# Patient Record
Sex: FEMALE | Race: WHITE | NOT HISPANIC OR LATINO | Employment: UNEMPLOYED | URBAN - METROPOLITAN AREA
[De-identification: names, ages, dates, MRNs, and addresses within clinical notes are randomized per-mention and may not be internally consistent; named-entity substitution may affect disease eponyms.]

---

## 2023-02-24 LAB — CALCIUM (MG/DL) IN SER/PLAS: 9.6 MG/DL (ref 8.6–10.6)

## 2023-06-02 DIAGNOSIS — Z00.00 HEALTHCARE MAINTENANCE: ICD-10-CM

## 2023-06-02 DIAGNOSIS — E11.9 TYPE 2 DIABETES MELLITUS WITHOUT COMPLICATION, WITHOUT LONG-TERM CURRENT USE OF INSULIN (MULTI): ICD-10-CM

## 2023-06-02 RX ORDER — ORAL SEMAGLUTIDE 7 MG/1
1 TABLET ORAL DAILY
Qty: 90 TABLET | Refills: 0 | Status: SHIPPED | OUTPATIENT
Start: 2023-06-02 | End: 2023-07-06 | Stop reason: SDUPTHER

## 2023-06-02 RX ORDER — CONJUGATED ESTROGENS 0.62 MG/G
CREAM VAGINAL
COMMUNITY
Start: 2017-09-20 | End: 2023-06-02 | Stop reason: SDUPTHER

## 2023-06-02 RX ORDER — ORAL SEMAGLUTIDE 7 MG/1
1 TABLET ORAL DAILY
COMMUNITY
Start: 2021-12-17 | End: 2023-06-02 | Stop reason: SDUPTHER

## 2023-06-29 LAB
ALANINE AMINOTRANSFERASE (SGPT) (U/L) IN SER/PLAS: 15 U/L (ref 7–45)
ALBUMIN (G/DL) IN SER/PLAS: 4.5 G/DL (ref 3.4–5)
ALBUMIN (MG/L) IN URINE: <7 MG/L
ALBUMIN/CREATININE (UG/MG) IN URINE: NORMAL UG/MG CRT (ref 0–30)
ALKALINE PHOSPHATASE (U/L) IN SER/PLAS: 37 U/L (ref 33–136)
ANION GAP IN SER/PLAS: 14 MMOL/L (ref 10–20)
ASPARTATE AMINOTRANSFERASE (SGOT) (U/L) IN SER/PLAS: 18 U/L (ref 9–39)
BILIRUBIN TOTAL (MG/DL) IN SER/PLAS: 0.5 MG/DL (ref 0–1.2)
CALCIDIOL (25 OH VITAMIN D3) (NG/ML) IN SER/PLAS: 58 NG/ML
CALCIUM (MG/DL) IN SER/PLAS: 9.1 MG/DL (ref 8.6–10.6)
CARBON DIOXIDE, TOTAL (MMOL/L) IN SER/PLAS: 29 MMOL/L (ref 21–32)
CHLORIDE (MMOL/L) IN SER/PLAS: 102 MMOL/L (ref 98–107)
CHOLESTEROL (MG/DL) IN SER/PLAS: 158 MG/DL (ref 0–199)
CHOLESTEROL IN HDL (MG/DL) IN SER/PLAS: 51.9 MG/DL
CHOLESTEROL/HDL RATIO: 3
COBALAMIN (VITAMIN B12) (PG/ML) IN SER/PLAS: 810 PG/ML (ref 211–911)
CREATININE (MG/DL) IN SER/PLAS: 0.64 MG/DL (ref 0.5–1.05)
CREATININE (MG/DL) IN URINE: 63.8 MG/DL (ref 20–320)
ERYTHROCYTE DISTRIBUTION WIDTH (RATIO) BY AUTOMATED COUNT: 12.8 % (ref 11.5–14.5)
ERYTHROCYTE MEAN CORPUSCULAR HEMOGLOBIN CONCENTRATION (G/DL) BY AUTOMATED: 32.1 G/DL (ref 32–36)
ERYTHROCYTE MEAN CORPUSCULAR VOLUME (FL) BY AUTOMATED COUNT: 104 FL (ref 80–100)
ERYTHROCYTES (10*6/UL) IN BLOOD BY AUTOMATED COUNT: 4.18 X10E12/L (ref 4–5.2)
ESTIMATED AVERAGE GLUCOSE FOR HBA1C: 169 MG/DL
GFR FEMALE: >90 ML/MIN/1.73M2
GLUCOSE (MG/DL) IN SER/PLAS: 162 MG/DL (ref 74–99)
HEMATOCRIT (%) IN BLOOD BY AUTOMATED COUNT: 43.6 % (ref 36–46)
HEMOGLOBIN (G/DL) IN BLOOD: 14 G/DL (ref 12–16)
HEMOGLOBIN A1C/HEMOGLOBIN TOTAL IN BLOOD: 7.5 %
LDL: 81 MG/DL (ref 0–99)
LEUKOCYTES (10*3/UL) IN BLOOD BY AUTOMATED COUNT: 6.8 X10E9/L (ref 4.4–11.3)
NRBC (PER 100 WBCS) BY AUTOMATED COUNT: 0 /100 WBC (ref 0–0)
PLATELETS (10*3/UL) IN BLOOD AUTOMATED COUNT: 245 X10E9/L (ref 150–450)
POTASSIUM (MMOL/L) IN SER/PLAS: 3.9 MMOL/L (ref 3.5–5.3)
PROTEIN TOTAL: 6.9 G/DL (ref 6.4–8.2)
SODIUM (MMOL/L) IN SER/PLAS: 141 MMOL/L (ref 136–145)
THYROTROPIN (MIU/L) IN SER/PLAS BY DETECTION LIMIT <= 0.05 MIU/L: 0.97 MIU/L (ref 0.44–3.98)
TRIGLYCERIDE (MG/DL) IN SER/PLAS: 128 MG/DL (ref 0–149)
UREA NITROGEN (MG/DL) IN SER/PLAS: 22 MG/DL (ref 6–23)
VLDL: 26 MG/DL (ref 0–40)

## 2023-07-06 ENCOUNTER — OFFICE VISIT (OUTPATIENT)
Dept: PRIMARY CARE | Facility: CLINIC | Age: 63
End: 2023-07-06
Payer: COMMERCIAL

## 2023-07-06 VITALS
DIASTOLIC BLOOD PRESSURE: 55 MMHG | SYSTOLIC BLOOD PRESSURE: 92 MMHG | BODY MASS INDEX: 17.88 KG/M2 | WEIGHT: 118 LBS | OXYGEN SATURATION: 94 % | HEART RATE: 79 BPM | HEIGHT: 68 IN

## 2023-07-06 DIAGNOSIS — E11.9 CONTROLLED TYPE 2 DIABETES MELLITUS WITHOUT COMPLICATION, WITHOUT LONG-TERM CURRENT USE OF INSULIN (MULTI): Primary | ICD-10-CM

## 2023-07-06 DIAGNOSIS — M81.0 AGE-RELATED OSTEOPOROSIS WITHOUT CURRENT PATHOLOGICAL FRACTURE: ICD-10-CM

## 2023-07-06 DIAGNOSIS — Z12.31 VISIT FOR SCREENING MAMMOGRAM: ICD-10-CM

## 2023-07-06 DIAGNOSIS — E78.00 PURE HYPERCHOLESTEROLEMIA: ICD-10-CM

## 2023-07-06 PROBLEM — E78.5 HYPERLIPEMIA: Status: ACTIVE | Noted: 2023-07-06

## 2023-07-06 PROBLEM — M51.26 LUMBAR DISC HERNIATION: Status: ACTIVE | Noted: 2023-07-06

## 2023-07-06 PROBLEM — H93.13 BILATERAL TINNITUS: Status: ACTIVE | Noted: 2023-07-06

## 2023-07-06 PROCEDURE — 99214 OFFICE O/P EST MOD 30 MIN: CPT | Performed by: FAMILY MEDICINE

## 2023-07-06 PROCEDURE — 3051F HG A1C>EQUAL 7.0%<8.0%: CPT | Performed by: FAMILY MEDICINE

## 2023-07-06 PROCEDURE — 1036F TOBACCO NON-USER: CPT | Performed by: FAMILY MEDICINE

## 2023-07-06 PROCEDURE — 3074F SYST BP LT 130 MM HG: CPT | Performed by: FAMILY MEDICINE

## 2023-07-06 PROCEDURE — 4010F ACE/ARB THERAPY RXD/TAKEN: CPT | Performed by: FAMILY MEDICINE

## 2023-07-06 PROCEDURE — 3078F DIAST BP <80 MM HG: CPT | Performed by: FAMILY MEDICINE

## 2023-07-06 RX ORDER — LISINOPRIL 2.5 MG/1
2.5 TABLET ORAL DAILY
Qty: 90 TABLET | Refills: 1 | Status: SHIPPED | OUTPATIENT
Start: 2023-07-06 | End: 2024-01-24 | Stop reason: SDUPTHER

## 2023-07-06 RX ORDER — LISINOPRIL 2.5 MG/1
1 TABLET ORAL DAILY
COMMUNITY
Start: 2017-09-19 | End: 2023-07-06 | Stop reason: SDUPTHER

## 2023-07-06 RX ORDER — ATORVASTATIN CALCIUM 10 MG/1
1 TABLET, FILM COATED ORAL NIGHTLY
COMMUNITY
Start: 2018-11-28 | End: 2023-07-06 | Stop reason: SDUPTHER

## 2023-07-06 RX ORDER — PIMECROLIMUS 10 MG/G
CREAM TOPICAL
COMMUNITY
Start: 2023-01-11

## 2023-07-06 RX ORDER — BISACODYL 5 MG/1
TABLET, COATED ORAL
COMMUNITY

## 2023-07-06 RX ORDER — METFORMIN HYDROCHLORIDE 1000 MG/1
1 TABLET ORAL 2 TIMES DAILY
COMMUNITY
Start: 2017-09-19 | End: 2023-07-06 | Stop reason: SDUPTHER

## 2023-07-06 RX ORDER — ATORVASTATIN CALCIUM 10 MG/1
10 TABLET, FILM COATED ORAL NIGHTLY
Qty: 90 TABLET | Refills: 1 | Status: SHIPPED | OUTPATIENT
Start: 2023-07-06 | End: 2024-01-24 | Stop reason: SDUPTHER

## 2023-07-06 RX ORDER — EMPAGLIFLOZIN 25 MG/1
1 TABLET, FILM COATED ORAL DAILY
COMMUNITY
Start: 2017-09-19 | End: 2023-07-06 | Stop reason: SDUPTHER

## 2023-07-06 RX ORDER — ORAL SEMAGLUTIDE 7 MG/1
1 TABLET ORAL DAILY
Qty: 90 TABLET | Refills: 0 | Status: SHIPPED | OUTPATIENT
Start: 2023-07-06 | End: 2023-12-11 | Stop reason: SDUPTHER

## 2023-07-06 RX ORDER — DENOSUMAB 60 MG/ML
60 INJECTION SUBCUTANEOUS
Qty: 1 ML | Refills: 1 | Status: SHIPPED | OUTPATIENT
Start: 2023-07-06 | End: 2024-01-24 | Stop reason: SDUPTHER

## 2023-07-06 RX ORDER — METFORMIN HYDROCHLORIDE 1000 MG/1
1000 TABLET ORAL
Qty: 180 TABLET | Refills: 1 | Status: SHIPPED | OUTPATIENT
Start: 2023-07-06 | End: 2024-01-24 | Stop reason: SDUPTHER

## 2023-07-06 RX ORDER — METRONIDAZOLE 7.5 MG/G
CREAM TOPICAL 2 TIMES DAILY
COMMUNITY
Start: 2023-01-11

## 2023-07-06 RX ORDER — CHOLECALCIFEROL (VITAMIN D3) 50 MCG
TABLET ORAL
COMMUNITY

## 2023-07-06 RX ORDER — DENOSUMAB 60 MG/ML
INJECTION SUBCUTANEOUS
COMMUNITY
Start: 2021-12-17 | End: 2023-07-06 | Stop reason: SDUPTHER

## 2023-07-06 ASSESSMENT — COLUMBIA-SUICIDE SEVERITY RATING SCALE - C-SSRS
2. HAVE YOU ACTUALLY HAD ANY THOUGHTS OF KILLING YOURSELF?: NO
1. IN THE PAST MONTH, HAVE YOU WISHED YOU WERE DEAD OR WISHED YOU COULD GO TO SLEEP AND NOT WAKE UP?: NO
6. HAVE YOU EVER DONE ANYTHING, STARTED TO DO ANYTHING, OR PREPARED TO DO ANYTHING TO END YOUR LIFE?: NO

## 2023-07-06 ASSESSMENT — PATIENT HEALTH QUESTIONNAIRE - PHQ9
SUM OF ALL RESPONSES TO PHQ9 QUESTIONS 1 AND 2: 0
2. FEELING DOWN, DEPRESSED OR HOPELESS: NOT AT ALL
1. LITTLE INTEREST OR PLEASURE IN DOING THINGS: NOT AT ALL

## 2023-07-06 ASSESSMENT — ENCOUNTER SYMPTOMS
OCCASIONAL FEELINGS OF UNSTEADINESS: 0
LOSS OF SENSATION IN FEET: 0
DEPRESSION: 0

## 2023-07-06 NOTE — PROGRESS NOTES
"Subjective   Patient ID: Sushila Gonsales is a 63 y.o. female who presents for Follow-up (6 months).    HPI   The patient reports that she is taking Metformin, Jardiance, Rybelsus and lisinopril for her diabetes, atorvastatin for her hyperlipidemia, Prolia for osteoporosis. She is taking these medications as prescribed and is tolerating them well. Her blood sugars have remained stable.    Review of Systems  Constitutional: No fever or chills  Cardiovascular: no chest pain, no palpitations and no syncope.   Respiratory: no cough, no shortness of breath during exertion and no shortness of breath at rest.   Gastrointestinal: no abdominal pain, no nausea and no vomiting.  Neuro: No Headache, no dizziness    Objective   BP 92/55   Pulse 79   Ht 1.727 m (5' 8\")   Wt 53.5 kg (118 lb)   SpO2 94%   BMI 17.94 kg/m²     Physical Exam  Constitutional: Alert and in no acute distress. Well developed, well nourished  Head and Face: Head and face: Normal.    Cardiovascular: Heart rate and rhythm were normal, normal S1 and S2. No peripheral edema.   Pulmonary: No respiratory distress. Clear bilateral breath sounds.  Musculoskeletal: Gait and station: Normal. Muscle strength/tone: Normal.   Skin: Normal skin color and pigmentation, normal skin turgor, and no rash.    Psychiatric: Judgment and insight: Intact. Mood and affect: Normal.    Lab Results   Component Value Date    WBC 6.8 06/29/2023    HGB 14.0 06/29/2023    HCT 43.6 06/29/2023     06/29/2023    CHOL 158 06/29/2023    TRIG 128 06/29/2023    HDL 51.9 06/29/2023    ALT 15 06/29/2023    AST 18 06/29/2023     06/29/2023    K 3.9 06/29/2023     06/29/2023    CREATININE 0.64 06/29/2023    BUN 22 06/29/2023    CO2 29 06/29/2023    TSH 0.97 06/29/2023    HGBA1C 7.5 (A) 06/29/2023       DIGITAL MAMM SCREENING  MRN: [id number]  Patient Name: SUSHILA GONSALES     STUDY:  DIGITAL MAMM SCREENING W/ ROBBY;  6/30/2022 11:16 am     ACCESSION NUMBER(S):  [id number]     ORDERING " CLINICIAN:  RXOANN JIN     INDICATION:  Screening.     COMPARISON:  07/02/2021, 06/11/2020, 06/17/2019, 06/04/2018     FINDINGS:  2D and tomosynthesis images were reviewed at 1 mm slice thickness.     There are areas of scattered fibroglandular tissue.   No suspicious  masses or calcifications are identified.     This study was interpreted with CAD.     IMPRESSION:  No mammographic evidence of malignancy.     BI-RADS CATEGORY:     Category: 1 - Negative.  Recommendation:  1 Year Screening.  Patient letter sent SNORM      Assessment/Plan   Diagnoses and all orders for this visit:  Controlled type 2 diabetes mellitus without complication, without long-term current use of insulin (CMS/[facility])  -     Hemoglobin A1C; Future  -     Comprehensive Metabolic Panel; Future  -     Follow Up In [facility] - PCP - Health Maintenance; Future  Age-related osteoporosis without current pathological fracture  Pure hypercholesterolemia  Visit for screening mammogram  -     BI mammo bilateral screening tomosynthesis; Future        Dear Sushila Garvin     It was my pleasure to take care of you today in the office. Below are the things we discussed today:     1. Diabetes: Last A1C 7.5. Continue Metformin, Jardiance, lisinopril and Rybelsus.     2. HLD: Continue Atorvastatin.     3. Osteoporosis: Continue Prolia.     4. Tetanus: The patient will confirm if it is up-to-date.    Your yearly Physical is due in:  January 2024  When you call the office for your yearly Physical, please ask them to inform me to order your blood work, so that you can get the fasting blood work before your appointment and we can discuss the results at your physical.      Please call me if any questions arise from now until your next visit. I will call you after I am done seeing patients. A Doctor is always available by phone when the office is closed. Please feel free to call for help with any problem that you feel shouldn't wait until the office  re-opens.     Scribe Attestation  By signing my name below, I, Jonathon Anton   attest that this documentation has been prepared under the direction and in the presence of Miri Ware MD.

## 2023-08-01 ENCOUNTER — TELEPHONE (OUTPATIENT)
Dept: PRIMARY CARE | Facility: CLINIC | Age: 63
End: 2023-08-01
Payer: COMMERCIAL

## 2023-08-01 DIAGNOSIS — U07.1 COVID-19 VIRUS INFECTION: Primary | ICD-10-CM

## 2023-08-28 ENCOUNTER — LAB (OUTPATIENT)
Dept: LAB | Facility: LAB | Age: 63
End: 2023-08-28
Payer: COMMERCIAL

## 2023-08-28 LAB — CALCIUM (MG/DL) IN SER/PLAS: 9.7 MG/DL (ref 8.6–10.6)

## 2023-12-11 DIAGNOSIS — Z71.84 COUNSELING FOR TRAVEL: Primary | ICD-10-CM

## 2023-12-11 DIAGNOSIS — E11.9 CONTROLLED TYPE 2 DIABETES MELLITUS WITHOUT COMPLICATION, WITHOUT LONG-TERM CURRENT USE OF INSULIN (MULTI): ICD-10-CM

## 2023-12-11 RX ORDER — ATOVAQUONE AND PROGUANIL HYDROCHLORIDE 250; 100 MG/1; MG/1
TABLET, FILM COATED ORAL
Qty: 24 TABLET | Refills: 0 | Status: SHIPPED | OUTPATIENT
Start: 2023-12-11 | End: 2024-01-24 | Stop reason: ALTCHOICE

## 2023-12-11 RX ORDER — CIPROFLOXACIN 500 MG/1
500 TABLET ORAL 2 TIMES DAILY
Qty: 10 TABLET | Refills: 0 | Status: SHIPPED | OUTPATIENT
Start: 2023-12-11 | End: 2023-12-16

## 2023-12-11 RX ORDER — ORAL SEMAGLUTIDE 7 MG/1
1 TABLET ORAL DAILY
Qty: 90 TABLET | Refills: 0 | Status: SHIPPED | OUTPATIENT
Start: 2023-12-11 | End: 2024-01-24 | Stop reason: SDUPTHER

## 2024-01-18 ENCOUNTER — LAB (OUTPATIENT)
Dept: LAB | Facility: LAB | Age: 64
End: 2024-01-18
Payer: COMMERCIAL

## 2024-01-18 DIAGNOSIS — E11.9 CONTROLLED TYPE 2 DIABETES MELLITUS WITHOUT COMPLICATION, WITHOUT LONG-TERM CURRENT USE OF INSULIN (MULTI): ICD-10-CM

## 2024-01-18 LAB
ALBUMIN SERPL BCP-MCNC: 4.3 G/DL (ref 3.4–5)
ALP SERPL-CCNC: 50 U/L (ref 33–136)
ALT SERPL W P-5'-P-CCNC: 16 U/L (ref 7–45)
ANION GAP SERPL CALC-SCNC: 13 MMOL/L (ref 10–20)
AST SERPL W P-5'-P-CCNC: 16 U/L (ref 9–39)
BILIRUB SERPL-MCNC: 0.7 MG/DL (ref 0–1.2)
BUN SERPL-MCNC: 16 MG/DL (ref 6–23)
CALCIUM SERPL-MCNC: 9 MG/DL (ref 8.6–10.6)
CHLORIDE SERPL-SCNC: 103 MMOL/L (ref 98–107)
CO2 SERPL-SCNC: 29 MMOL/L (ref 21–32)
CREAT SERPL-MCNC: 0.66 MG/DL (ref 0.5–1.05)
EGFRCR SERPLBLD CKD-EPI 2021: >90 ML/MIN/1.73M*2
EST. AVERAGE GLUCOSE BLD GHB EST-MCNC: 174 MG/DL
GLUCOSE SERPL-MCNC: 186 MG/DL (ref 74–99)
HBA1C MFR BLD: 7.7 %
POTASSIUM SERPL-SCNC: 4.1 MMOL/L (ref 3.5–5.3)
PROT SERPL-MCNC: 7.1 G/DL (ref 6.4–8.2)
SODIUM SERPL-SCNC: 141 MMOL/L (ref 136–145)

## 2024-01-18 PROCEDURE — 80053 COMPREHEN METABOLIC PANEL: CPT

## 2024-01-18 PROCEDURE — 36415 COLL VENOUS BLD VENIPUNCTURE: CPT

## 2024-01-18 PROCEDURE — 83036 HEMOGLOBIN GLYCOSYLATED A1C: CPT

## 2024-01-24 ENCOUNTER — OFFICE VISIT (OUTPATIENT)
Dept: PRIMARY CARE | Facility: CLINIC | Age: 64
End: 2024-01-24
Payer: COMMERCIAL

## 2024-01-24 VITALS
HEIGHT: 68 IN | HEART RATE: 81 BPM | BODY MASS INDEX: 18.1 KG/M2 | WEIGHT: 119.4 LBS | DIASTOLIC BLOOD PRESSURE: 81 MMHG | SYSTOLIC BLOOD PRESSURE: 110 MMHG | OXYGEN SATURATION: 97 %

## 2024-01-24 DIAGNOSIS — E55.9 VITAMIN D DEFICIENCY: ICD-10-CM

## 2024-01-24 DIAGNOSIS — R94.6 ABNORMAL THYROID EXAM: ICD-10-CM

## 2024-01-24 DIAGNOSIS — M81.0 AGE-RELATED OSTEOPOROSIS WITHOUT CURRENT PATHOLOGICAL FRACTURE: ICD-10-CM

## 2024-01-24 DIAGNOSIS — E78.00 PURE HYPERCHOLESTEROLEMIA: ICD-10-CM

## 2024-01-24 DIAGNOSIS — E11.9 CONTROLLED TYPE 2 DIABETES MELLITUS WITHOUT COMPLICATION, WITHOUT LONG-TERM CURRENT USE OF INSULIN (MULTI): Primary | ICD-10-CM

## 2024-01-24 PROCEDURE — 3051F HG A1C>EQUAL 7.0%<8.0%: CPT | Performed by: FAMILY MEDICINE

## 2024-01-24 PROCEDURE — 3079F DIAST BP 80-89 MM HG: CPT | Performed by: FAMILY MEDICINE

## 2024-01-24 PROCEDURE — 4010F ACE/ARB THERAPY RXD/TAKEN: CPT | Performed by: FAMILY MEDICINE

## 2024-01-24 PROCEDURE — 1036F TOBACCO NON-USER: CPT | Performed by: FAMILY MEDICINE

## 2024-01-24 PROCEDURE — 3074F SYST BP LT 130 MM HG: CPT | Performed by: FAMILY MEDICINE

## 2024-01-24 PROCEDURE — 99214 OFFICE O/P EST MOD 30 MIN: CPT | Performed by: FAMILY MEDICINE

## 2024-01-24 RX ORDER — METFORMIN HYDROCHLORIDE 1000 MG/1
1000 TABLET ORAL
Qty: 180 TABLET | Refills: 1 | Status: SHIPPED | OUTPATIENT
Start: 2024-01-24

## 2024-01-24 RX ORDER — DIPHENHYDRAMINE HYDROCHLORIDE 50 MG/ML
50 INJECTION INTRAMUSCULAR; INTRAVENOUS AS NEEDED
Status: CANCELLED | OUTPATIENT
Start: 2024-03-12

## 2024-01-24 RX ORDER — LISINOPRIL 2.5 MG/1
2.5 TABLET ORAL DAILY
Qty: 90 TABLET | Refills: 1 | Status: SHIPPED | OUTPATIENT
Start: 2024-01-24

## 2024-01-24 RX ORDER — ALBUTEROL SULFATE 0.83 MG/ML
3 SOLUTION RESPIRATORY (INHALATION) AS NEEDED
Status: CANCELLED | OUTPATIENT
Start: 2024-03-12

## 2024-01-24 RX ORDER — FAMOTIDINE 10 MG/ML
20 INJECTION INTRAVENOUS ONCE AS NEEDED
Status: CANCELLED | OUTPATIENT
Start: 2024-03-12

## 2024-01-24 RX ORDER — ATORVASTATIN CALCIUM 10 MG/1
10 TABLET, FILM COATED ORAL NIGHTLY
Qty: 90 TABLET | Refills: 1 | Status: SHIPPED | OUTPATIENT
Start: 2024-01-24

## 2024-01-24 RX ORDER — EPINEPHRINE 0.3 MG/.3ML
0.3 INJECTION SUBCUTANEOUS EVERY 5 MIN PRN
Status: CANCELLED | OUTPATIENT
Start: 2024-03-12

## 2024-01-24 NOTE — PROGRESS NOTES
"Subjective   Patient ID: Sushila Garvin is a 63 y.o. female who presents for Diabetes.    HPI   The patient reports that she is taking Metformin, Jardiance, lisinopril and Rybelsus for her diabetes, atorvastatin for hyperlipidemia and Prolia for osteoporosis. She is taking these medications as prescribed and has no side effects from them. She complains of hip pain.    Review of Systems  Constitutional: No fever or chills  Cardiovascular: no chest pain, no palpitations and no syncope.   Respiratory: no cough, no shortness of breath during exertion and no shortness of breath at rest.   Gastrointestinal: no abdominal pain, no nausea and no vomiting.  Neuro: No Headache, no dizziness  MSK: + hip pain    Objective   /81 (BP Location: Right arm, Patient Position: Sitting, BP Cuff Size: Adult)   Pulse 81   Ht 1.727 m (5' 8\")   Wt 54.2 kg (119 lb 6.4 oz)   SpO2 97%   BMI 18.15 kg/m²     Physical Exam  Constitutional: Alert and in no acute distress. Well developed, well nourished  Head and Face: Head and face: Normal.    Cardiovascular: Heart rate and rhythm were normal, normal S1 and S2. No peripheral edema.   Pulmonary: No respiratory distress. Clear bilateral breath sounds.  Musculoskeletal: Gait and station: Normal. Muscle strength/tone: Normal.   Skin: Normal skin color and pigmentation, normal skin turgor, and no rash.    Psychiatric: Judgment and insight: Intact. Mood and affect: Normal.    Lab Results   Component Value Date    WBC 6.8 06/29/2023    HGB 14.0 06/29/2023    HCT 43.6 06/29/2023     06/29/2023    CHOL 158 06/29/2023    TRIG 128 06/29/2023    HDL 51.9 06/29/2023    ALT 16 01/18/2024    AST 16 01/18/2024     01/18/2024    K 4.1 01/18/2024     01/18/2024    CREATININE 0.66 01/18/2024    BUN 16 01/18/2024    CO2 29 01/18/2024    TSH 0.97 06/29/2023    HGBA1C 7.7 (H) 01/18/2024       BI mammo bilateral screening tomosynthesis  Narrative: Interpreted By:  GREGORIO VEGA MD  MRN: " [id number]  Patient Name: ZACH GONSALES     STUDY:  DIGITAL MAMM SCREENING W/ ROBBY;  8/9/2023 10:40 am     ACCESSION NUMBER(S):  [id number]     ORDERING CLINICIAN:  ROXANN JIN     INDICATION:  Screening.     COMPARISON:  07/02/2021 06/11/2020     FINDINGS:  2D and tomosynthesis images were reviewed at 1 mm slice thickness.     Density:  There are areas of scattered fibroglandular tissue.     No suspicious masses or calcifications are identified.     Impression: No mammographic evidence of malignancy.     BI-RADS CATEGORY:     Category: 1 - Negative.  Recommendation: 1 Year Screening.     For any future breast imaging appointments, please call [phone]  [phone]).        MACRO:  None      Assessment/Plan   Diagnoses and all orders for this visit:  Controlled type 2 diabetes mellitus without complication, without long-term current use of insulin (CMS/[facility])  -     Follow Up In [facility] - PCP - Health Maintenance  -     semaglutide (Rybelsus) 7 mg tablet; Take 1 tablet (7 mg) by mouth once daily.  -     metFORMIN (Glucophage) 1,000 mg tablet; Take 1 tablet (1,000 mg) by mouth 2 times a day with meals.  -     lisinopril 2.5 mg tablet; Take 1 tablet (2.5 mg) by mouth once daily.  -     empagliflozin (Jardiance) 25 mg; Take 1 tablet (25 mg) by mouth once daily.  -     Follow Up In [facility] - PCP - Health Maintenance; Future  -     Albumin , Urine Random; Future  -     CBC; Future  -     Comprehensive Metabolic Panel; Future  -     Hemoglobin A1C; Future  Age-related osteoporosis without current pathological fracture  -     denosumab (Prolia) 60 mg/mL syringe; Inject 1 mL (60 mg total) under the skin every 6 months.  Pure hypercholesterolemia  -     atorvastatin (Lipitor) 10 mg tablet; Take 1 tablet (10 mg) by mouth once daily at bedtime.  -     Lipid Panel; Future  Abnormal thyroid exam  -     TSH with reflex to Free T4 if abnormal; Future  Vitamin D deficiency  -     Vitamin B12; Future  -      Vitamin D 25-Hydroxy,Total (for eval of Vitamin D levels); Future  Other orders  -     denosumab (Prolia) injection 60 mg  -     sodium chloride 0.9 % bolus 500 mL  -     dextrose 5 % in water (D5W) bolus  -     diphenhydrAMINE (BENADryl) injection 50 mg  -     methylPREDNISolone sod succinate (PF) (SOLU-Medrol) 40 mg/mL injection 40 mg  -     famotidine PF (Pepcid) injection 20 mg  -     EPINEPHrine (Epipen) injection syringe 0.3 mg  -     albuterol 2.5 mg /3 mL (0.083 %) nebulizer solution 3 mL        Dear Sushila Garvin     It was my pleasure to take care of you today in the office. Below are the things we discussed today:    1. Diabetes: Last A1C 7.7. Continue Metformin, Jardiance, lisinopril and Rybelsus.     2. HLD: Continue Atorvastatin.     3. Osteoporosis: Continue Prolia.    4. Greater trochanteric bursitis: Advised her to ice the area as much as possible and use over-the-counter Voltaren gel as needed for pain. She will let me know if her pain does not improve and would like to get an injection.     Your yearly Physical is due in: Jul 2024   When you call the office for your yearly Physical, please ask them to inform me to order your blood work, so that you can get the fasting blood work before your appointment and we can discuss the results at your physical.      Please call me if any questions arise from now until your next visit. I will call you after I am done seeing patients. A Doctor is always available by phone when the office is closed. Please feel free to call for help with any problem that you feel shouldn't wait until the office re-opens.     Scribe Attestation  By signing my name below, I, Jonathon Anton   attest that this documentation has been prepared under the direction and in the presence of Miri Ware MD.

## 2024-03-06 ENCOUNTER — LAB (OUTPATIENT)
Dept: LAB | Facility: LAB | Age: 64
End: 2024-03-06
Payer: COMMERCIAL

## 2024-03-06 DIAGNOSIS — M81.0 AGE-RELATED OSTEOPOROSIS WITHOUT CURRENT PATHOLOGICAL FRACTURE: Primary | ICD-10-CM

## 2024-03-06 LAB — CALCIUM SERPL-MCNC: 9.7 MG/DL (ref 8.6–10.6)

## 2024-03-06 PROCEDURE — 36415 COLL VENOUS BLD VENIPUNCTURE: CPT

## 2024-03-06 PROCEDURE — 82310 ASSAY OF CALCIUM: CPT

## 2024-03-12 ENCOUNTER — INFUSION (OUTPATIENT)
Dept: INFUSION THERAPY | Facility: CLINIC | Age: 64
End: 2024-03-12
Payer: COMMERCIAL

## 2024-03-12 VITALS
DIASTOLIC BLOOD PRESSURE: 64 MMHG | HEART RATE: 69 BPM | SYSTOLIC BLOOD PRESSURE: 103 MMHG | WEIGHT: 121.25 LBS | OXYGEN SATURATION: 95 % | RESPIRATION RATE: 16 BRPM | TEMPERATURE: 97.7 F | BODY MASS INDEX: 18.44 KG/M2

## 2024-03-12 DIAGNOSIS — M81.0 AGE-RELATED OSTEOPOROSIS WITHOUT CURRENT PATHOLOGICAL FRACTURE: ICD-10-CM

## 2024-03-12 PROCEDURE — 96372 THER/PROPH/DIAG INJ SC/IM: CPT | Performed by: NURSE PRACTITIONER

## 2024-03-12 RX ORDER — ALBUTEROL SULFATE 0.83 MG/ML
3 SOLUTION RESPIRATORY (INHALATION) AS NEEDED
OUTPATIENT
Start: 2024-09-08

## 2024-03-12 RX ORDER — EPINEPHRINE 0.3 MG/.3ML
0.3 INJECTION SUBCUTANEOUS EVERY 5 MIN PRN
OUTPATIENT
Start: 2024-09-08

## 2024-03-12 RX ORDER — FAMOTIDINE 10 MG/ML
20 INJECTION INTRAVENOUS ONCE AS NEEDED
OUTPATIENT
Start: 2024-09-08

## 2024-03-12 RX ORDER — DIPHENHYDRAMINE HYDROCHLORIDE 50 MG/ML
50 INJECTION INTRAMUSCULAR; INTRAVENOUS AS NEEDED
OUTPATIENT
Start: 2024-09-08

## 2024-03-12 ASSESSMENT — ENCOUNTER SYMPTOMS
HEMATURIA: 0
NAUSEA: 0
FREQUENCY: 0
TROUBLE SWALLOWING: 0
LEG SWELLING: 0
NERVOUS/ANXIOUS: 0
WHEEZING: 0
COUGH: 0
LIGHT-HEADEDNESS: 0
ABDOMINAL PAIN: 0
EXTREMITY WEAKNESS: 0
FATIGUE: 0
DIARRHEA: 0
ARTHRALGIAS: 0
APPETITE CHANGE: 0
SHORTNESS OF BREATH: 0
FEVER: 0
VOICE CHANGE: 0
VOMITING: 0
EYE PROBLEMS: 0
WOUND: 0
DYSURIA: 0
CHILLS: 0
SORE THROAT: 0
NUMBNESS: 0
HEADACHES: 0
CONFUSION: 0
BRUISES/BLEEDS EASILY: 0
BLOOD IN STOOL: 0
UNEXPECTED WEIGHT CHANGE: 0
MYALGIAS: 0
SLEEP DISTURBANCE: 0
CONSTIPATION: 0
PALPITATIONS: 0
DIZZINESS: 0
DEPRESSION: 0

## 2024-03-12 NOTE — PATIENT INSTRUCTIONS
Today :We administered denosumab. Prolia 60mg subcutaneous injection left lower abdomen  Blood Calcium within 28 days of next Prolia injection    For:   1. Age-related osteoporosis without current pathological fracture       Your next appointment is due in: 6 months       Please read the  Medication Guide that was given to you and reviewed during todays visit.     (Tell all doctors including dentists that you are taking this medication)     Go to the emergency room or call 911 if:  -You have signs of allergic reaction:   -Rash, hives, itching.   -Swollen, blistered, peeling skin.   -Swelling of face, lips, mouth, tongue or throat.   -Tightness of chest, trouble breathing, swallowing or talking     Call your doctor:  - If injection site gets red, warm, swollen, itchy or leaks fluid or pus.     (Leave band aid on your injection site for at least 2 hours and keep area clean and dry  - If you get sick or have symptoms of infection or are not feeling well for any reason.    (Wash your hands often, stay away from people who are sick)  - If you have side effects from your medication that do not go away or are bothersome.     (Refer to the teaching your nurse gave you for side effects to call your doctor about)    - Common side effects may include:  stuffy nose, headache, feeling tired, muscle aches, upset stomach  - Before receiving any vaccines     - Call the [facility] at   If:  - You get sick, are on antibiotics, have had a recent vaccine, have surgery or dental work and your doctor wants your visit rescheduled.  - You need to cancel and reschedule your visit for any reason. Call at least 2 days before your visit if you need to cancel.   - Your insurance changes before your next visit.    (We will need to get approval from your new insurance. This can take up to two weeks.)     The [facility] is opened Monday thru Friday. We are closed on weekends and holidays.   Voice mail will take  your call if the center is closed. If you leave a message please allow 24 hours for a call back during weekdays. If you leave a message on a weekend/holiday, we will call you back the next business day.

## 2024-03-12 NOTE — PROGRESS NOTES
[facility]   Infusion Clinic Note   Date: 2024   Name: Sushila Garvin  : 1960   MRN: [id number]         Reason for Visit: Injections (Every 6 months Prolia 60mg subcutaneous injection)         Visit Type: INJECTION       Ordered By:  Miri Ware MD      Accompanied by: Self      Diagnosis: Age-related osteoporosis without current pathological fracture       Allergies:   Allergies as of 2024    (No Known Allergies)         Current Medications has a current medication list which includes the following prescription(s): atorvastatin, cholecalciferol, denosumab, empagliflozin, estrogens (conjugated), lisinopril, metformin, metronidazole, multivitamin 50 plus, pimecrolimus, and semaglutide.       Vitals:   Vitals:    24 1057   BP: 103/64   Pulse: 69   Resp: 16   Temp: 36.5 °C (97.7 °F)   TempSrc: Temporal   SpO2: 95%   Weight: 55 kg (121 lb 4.1 oz)             Infusion Pre-procedure Checklist:   - Allergies reviewed: yes   - Medications reviewed: yes       - Previous reaction to current treatment: no      Assess patient for the concerns below. Document provider notification as appropriate.  - Active or recent infection with/without current antibiotic use: no  - Recent or planned invasive dental work: no  - Recent or planned surgeries: no  - Recently received or plans to receive vaccinations: no  - Has treatment related toxicities: no  - Is pregnant:  no      Pain: 0   - Is the pain different from normal: n/a   - Is the pain tolerable: n/a   - Is your Doctor aware:  n/a               Fall Risk Screening: [place] Fall Risk  Ambulatory Aid: Walks without aid/bedrest/nurse assist  Intravenous Therapy/Heparin Lock: No  Mental Status: Oriented to own ability       Review Of Systems:  Review of Systems   Constitutional:  Negative for appetite change, chills, fatigue, fever and unexpected weight change.   HENT:   Negative for hearing loss, mouth sores, sore throat, tinnitus,  trouble swallowing and voice change.    Eyes:  Negative for eye problems.        Glasses for reading   Respiratory:  Negative for cough, shortness of breath and wheezing.    Cardiovascular:  Negative for chest pain, leg swelling and palpitations.   Gastrointestinal:  Negative for abdominal pain, blood in stool, constipation, diarrhea, nausea and vomiting.   Genitourinary:  Negative for dysuria, frequency and hematuria.    Musculoskeletal:  Negative for arthralgias and myalgias.   Skin:  Negative for itching, rash and wound.   Neurological:  Negative for dizziness, extremity weakness, headaches, light-headedness and numbness.   Hematological:  Does not bruise/bleed easily.   Psychiatric/Behavioral:  Negative for confusion, depression and sleep disturbance. The patient is not nervous/anxious.          Infusion Readiness:   - Assessment Concerns Related to Infusion: No  - Provider notified: n/a      Document Below Only If Indicated:   New Patient Education:    N/A (returning patient for continuation of therapy. Ongoing education provided as needed.)        Treatment Conditions & Drug Specific Questions:    Denosumab  (PROLIA. XGEVA)    (Unless otherwise specified on patient specific therapy plan):     TREATMENT CONDITIONS:  Unless otherwise specified on patient specific therapy plan HOLD and notify provider prior to proceeding with today's injection if patients:  o Calcium is LESS THAN 8.6 mg/dL OR  Ionized Calcium LESS THAN 1.1 mmol/L  o Recent or planned invasive dental procedure (within 4 weeks)    Lab Results   Component Value Date    CALCIUM 9.7 03/06/2024    PHOS 4.9 12/19/2022      Labs reviewed and patient meets treatment conditions? Yes    DRUG SPECIFIC QUESTIONS:  Is the patient taking calcium and vitamin D? Yes Vitamin D, No Calcium supplementation-H/O kidney stones  (Recommended)    Pt Instructed on following risks: (1) hypocalcemia, (2) osteonecrosis of the jaw, (3) atypical femoral fractures, (4) serious  infections, and (5) dermatologic reactions?  Yes      REMINDER:  PREGNANCY CATEGORY X DRUG. OBTAIN NEGTATIVE PREGNANCY TEST PRIOR TO FIRST INFUSION FOR WOMEN OF CHILDBEARING ABILITY   REMS DRUG    Recommended Vitals/Observation:  Vitals: Obtain vitals prior to injection.  Observation: Patient may leave immediately following injection.        Weight Based Drug Calculations:    WEIGHT BASED DRUGS: NOT APPLICABLE / FLAT DOSE          Initiated By: Mere Hu LPN   Time: 11:08 AM     We administered denosumab.

## 2024-07-18 ENCOUNTER — LAB (OUTPATIENT)
Dept: LAB | Facility: LAB | Age: 64
End: 2024-07-18
Payer: COMMERCIAL

## 2024-07-18 DIAGNOSIS — R94.6 ABNORMAL THYROID EXAM: ICD-10-CM

## 2024-07-18 DIAGNOSIS — E78.00 PURE HYPERCHOLESTEROLEMIA: ICD-10-CM

## 2024-07-18 DIAGNOSIS — E55.9 VITAMIN D DEFICIENCY: ICD-10-CM

## 2024-07-18 DIAGNOSIS — E11.9 CONTROLLED TYPE 2 DIABETES MELLITUS WITHOUT COMPLICATION, WITHOUT LONG-TERM CURRENT USE OF INSULIN (MULTI): ICD-10-CM

## 2024-07-18 LAB
25(OH)D3 SERPL-MCNC: 61 NG/ML (ref 30–100)
ALBUMIN SERPL BCP-MCNC: 4.3 G/DL (ref 3.4–5)
ALP SERPL-CCNC: 40 U/L (ref 33–136)
ALT SERPL W P-5'-P-CCNC: 14 U/L (ref 7–45)
ANION GAP SERPL CALC-SCNC: 12 MMOL/L (ref 10–20)
AST SERPL W P-5'-P-CCNC: 19 U/L (ref 9–39)
BILIRUB SERPL-MCNC: 0.6 MG/DL (ref 0–1.2)
BUN SERPL-MCNC: 21 MG/DL (ref 6–23)
CALCIUM SERPL-MCNC: 9.2 MG/DL (ref 8.6–10.6)
CHLORIDE SERPL-SCNC: 100 MMOL/L (ref 98–107)
CHOLEST SERPL-MCNC: 168 MG/DL (ref 0–199)
CHOLESTEROL/HDL RATIO: 2.4
CO2 SERPL-SCNC: 32 MMOL/L (ref 21–32)
CREAT SERPL-MCNC: 0.67 MG/DL (ref 0.5–1.05)
CREAT UR-MCNC: 64.3 MG/DL (ref 20–320)
EGFRCR SERPLBLD CKD-EPI 2021: >90 ML/MIN/1.73M*2
ERYTHROCYTE [DISTWIDTH] IN BLOOD BY AUTOMATED COUNT: 13.2 % (ref 11.5–14.5)
EST. AVERAGE GLUCOSE BLD GHB EST-MCNC: 186 MG/DL
GLUCOSE SERPL-MCNC: 204 MG/DL (ref 74–99)
HBA1C MFR BLD: 8.1 %
HCT VFR BLD AUTO: 44.3 % (ref 36–46)
HDLC SERPL-MCNC: 68.7 MG/DL
HGB BLD-MCNC: 14 G/DL (ref 12–16)
LDLC SERPL CALC-MCNC: 76 MG/DL
MCH RBC QN AUTO: 33.2 PG (ref 26–34)
MCHC RBC AUTO-ENTMCNC: 31.6 G/DL (ref 32–36)
MCV RBC AUTO: 105 FL (ref 80–100)
MICROALBUMIN UR-MCNC: 7.4 MG/L
MICROALBUMIN/CREAT UR: 11.5 UG/MG CREAT
NON HDL CHOLESTEROL: 99 MG/DL (ref 0–149)
NRBC BLD-RTO: 0 /100 WBCS (ref 0–0)
PLATELET # BLD AUTO: 233 X10*3/UL (ref 150–450)
POTASSIUM SERPL-SCNC: 4 MMOL/L (ref 3.5–5.3)
PROT SERPL-MCNC: 6.6 G/DL (ref 6.4–8.2)
RBC # BLD AUTO: 4.22 X10*6/UL (ref 4–5.2)
SODIUM SERPL-SCNC: 140 MMOL/L (ref 136–145)
TRIGL SERPL-MCNC: 119 MG/DL (ref 0–149)
TSH SERPL-ACNC: 1.33 MIU/L (ref 0.44–3.98)
VIT B12 SERPL-MCNC: 709 PG/ML (ref 211–911)
VLDL: 24 MG/DL (ref 0–40)
WBC # BLD AUTO: 7.5 X10*3/UL (ref 4.4–11.3)

## 2024-07-18 PROCEDURE — 80061 LIPID PANEL: CPT

## 2024-07-18 PROCEDURE — 82607 VITAMIN B-12: CPT

## 2024-07-18 PROCEDURE — 82306 VITAMIN D 25 HYDROXY: CPT

## 2024-07-18 PROCEDURE — 82043 UR ALBUMIN QUANTITATIVE: CPT

## 2024-07-18 PROCEDURE — 80053 COMPREHEN METABOLIC PANEL: CPT

## 2024-07-18 PROCEDURE — 36415 COLL VENOUS BLD VENIPUNCTURE: CPT

## 2024-07-18 PROCEDURE — 84443 ASSAY THYROID STIM HORMONE: CPT

## 2024-07-18 PROCEDURE — 83036 HEMOGLOBIN GLYCOSYLATED A1C: CPT

## 2024-07-18 PROCEDURE — 82570 ASSAY OF URINE CREATININE: CPT

## 2024-07-18 PROCEDURE — 85027 COMPLETE CBC AUTOMATED: CPT

## 2024-07-22 NOTE — PROGRESS NOTES
"Subjective   Patient ID: Sushila Garvin is a 64 y.o. female who presents for Annual Exam.    Last Annual Physical: July 2023   Last Dental Visit: April 2024   Last Eye exam: Up-to-date  Hearing Concerns: No  Diet: Well- balanced   Exercise Routine: Some exercise       HPI   The patient's cholesterol is stable on the current dose of atorvastatin. She is also taking metformin, Jardiance and Rybelsus for diabetes, is on Prolia for osteoporosis and metronidazole and doxycyline for rosacea. She is taking these medications as prescribed and is tolerating them well. She attributes her increased A1c to her being generally stressed.    Review of Systems  Constitutional: No fever or chills, No Night Sweats  Eyes: No Blurry Vision or Eye sight problems  ENT: No Nasal Discharge, Hoarseness, sore throat  Cardiovascular: no chest pain, no palpitations and no syncope.   Respiratory: no cough, no shortness of breath during exertion and no shortness of breath at rest.   Gastrointestinal: no abdominal pain, no nausea and no vomiting.   : No vaginal discharge, burning with urination, no blood in urine or stools  Skin: No Skin rashes or Lesions  Neuro: No Headache, no dizziness or Numbness or tingling  Psych: No Anxiety, depression or sleeping problems  Heme: No Easy bleeding or brusing.     Objective   /71   Pulse 73   Ht 1.727 m (5' 8\")   Wt 54.4 kg (120 lb)   SpO2 94%   BMI 18.25 kg/m²     Physical Exam  Patient declined Chaperone  Constitutional: Alert and in no acute distress. Well developed, well nourished.   Head and Face: Head and face: Normal.    Eyes: Normal external exam. Pupils were equal in size, round, reactive to light (PERRL) with normal accommodation and extraocular movements intact (EOMI).   Ears, Nose, Mouth, and Throat: External inspection of ears and nose: Normal.  Hearing: Normal.  Nasal mucosa, septum, and turbinates: Normal.  Lips, teeth, and gums: Normal.  Oropharynx: Normal.   Neck: No neck mass " was observed. Supple. Thyroid not enlarged and there were no palpable thyroid nodules.   Cardiovascular: Heart rate and rhythm were normal, normal S1 and S2. Pedal pulses: Normal. No peripheral edema.   Pulmonary: No respiratory distress. Clear bilateral breath sounds.   Breast: Normal Appearance, No Masses or lumps palpated  Abdomen: Soft nontender; no abdominal mass palpated. Normal bowel sounds. No organomegaly.   : Normal cervix and vagina   Musculoskeletal: No joint swelling seen, normal movements of all extremities. Range of motion: Normal.  Muscle strength/tone: Normal.    Skin: Normal skin color and pigmentation, normal skin turgor, and no rash.   Neurologic: Deep tendon reflexes were 2+ and symmetric.   Psychiatric: Judgment and insight: Intact. Mood and affect: Normal.  Lymphatic: No cervical lymphadenopathy. Palpation of lymph nodes in axillae: Normal.  Palpation of lymph nodes in groin: Normal.    Lab Results   Component Value Date    WBC 7.5 07/18/2024    HGB 14.0 07/18/2024    HCT 44.3 07/18/2024     07/18/2024    CHOL 168 07/18/2024    TRIG 119 07/18/2024    HDL 68.7 07/18/2024    ALT 14 07/18/2024    AST 19 07/18/2024     07/18/2024    K 4.0 07/18/2024     07/18/2024    CREATININE 0.67 07/18/2024    BUN 21 07/18/2024    CO2 32 07/18/2024    TSH 1.33 07/18/2024    HGBA1C 8.1 (H) 07/18/2024       BI mammo bilateral screening tomosynthesis  Narrative: Interpreted By:  GREGORIO VEGA MD  MRN: [id number]  Patient Name: ZACH GONSALES     STUDY:  DIGITAL MAMM SCREENING W/ ROBBY;  8/9/2023 10:40 am     ACCESSION NUMBER(S):  [id number]     ORDERING CLINICIAN:  ROXANN JIN     INDICATION:  Screening.     COMPARISON:  07/02/2021 06/11/2020     FINDINGS:  2D and tomosynthesis images were reviewed at 1 mm slice thickness.     Density:  There are areas of scattered fibroglandular tissue.     No suspicious masses or calcifications are identified.     Impression: No mammographic evidence of  malignancy.     BI-RADS CATEGORY:     Category: 1 - Negative.  Recommendation: 1 Year Screening.     For any future breast imaging appointments, please call [phone]  [phone]).        MACRO:  None      Assessment/Plan   Diagnoses and all orders for this visit:  Annual physical exam  Controlled type 2 diabetes mellitus without complication, without long-term current use of insulin (Multi)  -     Follow Up In [facility] - PCP - Health Maintenance  -     Follow Up In [facility] - PCP - Established; Future  -     empagliflozin (Jardiance) 25 mg; Take 1 tablet (25 mg) by mouth once daily.  -     lisinopril 2.5 mg tablet; Take 1 tablet (2.5 mg) by mouth once daily.  -     metFORMIN (Glucophage) 1,000 mg tablet; Take 1 tablet (1,000 mg) by mouth 2 times daily (morning and late afternoon).  -     semaglutide (Rybelsus) 7 mg tablet; Take 1 tablet (7 mg) by mouth once daily.  -     Hemoglobin A1C; Future  -     Comprehensive Metabolic Panel; Future  Pure hypercholesterolemia  -     atorvastatin (Lipitor) 10 mg tablet; Take 1 tablet (10 mg) by mouth once daily at bedtime.  Age-related osteoporosis without current pathological fracture  Visit for screening mammogram  -     BI mammo bilateral screening tomosynthesis; Future  Screening for cervical cancer  -     THINPREP PAP TEST  Other specified menopausal and perimenopausal disorders  -     XR DEXA bone density; Future        Dear Sushila Garvin     It was my pleasure to take care of you today in the office. Below are the things we discussed today:    1. 1. Immunizations: Yearly Flu shot is recommended.          a: COVID: Please get new booster from the pharmacy in fall          b: Tetanus: She will check if it is up-to-date          c: Shingrix: Up-to-date         d: Pneumovax: Up-to-date         e: Prevnar: Up-to-date         F. RSV: Up-to-date     2. Blood Work: Reviewed   3. Seen your dentist twice a year  4. Yearly Eye exam is recommended    5. BMI:  Underweight   6: Diet recommendations:   Eat Clean, Try to have as many home cooked meals as possible  Avoid processed foods which contain excess calories, sugar, and sodium.    7. Exercise recommendations:   150 minutes a week to maintain your weight     If you have to loose weight, you need a better diet and exercise plan.     8. Supplements recommended:  a - Calcium 600 mg up to twice a day to get a total of 1200 mg. Each 8 oz of milk or yogurt or 1 oz of cheese, 1 Banana, 1 serving of green Leafy vegetable has about 300 mg of Calcium, so you may subtract that amount. Calcium citrate is the only acceptable supplement to take if you take an acid suppressing medication like Prilosec; otherwise Calcium carbonate is acceptable too (It can cause Constipation).   b - Vitamin D - 2000 IU daily     9. Please get your Living will / Advance directive completed if you do not have one already. Please make sure our office has a copy of the latest one.     10. Colonoscopy: Uptodate. Last done in Feb 2017, repeat in Feb 2027  11. Mammogram: Uptodate. Ordered for Aug 2024   12. PAP: Collected today   13. DEXA: Ordered  14: Skin Check: Please see Dermatology once a year for a Skin Check.     15. Diabetes: Last A1C showed 8.1. Continue Metformin, Jardiance and Rybelsus.     16. HLD: Stable. Continue Atorvastatin.     17. Osteoporosis: Continue Prolia.    18. Rosacea: The patient is on metronidazole and doxycyline.     Follow up in 6 months.    Follow up in one year for a Physical. Please call the office before your Physical to see if you need blood work completed prior to your physical.     Please call me if any questions arise from now until your next visit. I will call you after I am done seeing patients. A Doctor is always available by phone when the office is closed. Please feel free to call for help with any problem that you feel shouldn't wait until the office re-opens.     Scribe Attestation  By signing my name below, I,  Jonathon Anton   attest that this documentation has been prepared under the direction and in the presence of Miri Ware MD.

## 2024-07-24 ENCOUNTER — APPOINTMENT (OUTPATIENT)
Dept: PRIMARY CARE | Facility: CLINIC | Age: 64
End: 2024-07-24
Payer: COMMERCIAL

## 2024-07-24 VITALS
BODY MASS INDEX: 18.19 KG/M2 | WEIGHT: 120 LBS | DIASTOLIC BLOOD PRESSURE: 71 MMHG | SYSTOLIC BLOOD PRESSURE: 107 MMHG | HEIGHT: 68 IN | HEART RATE: 73 BPM | OXYGEN SATURATION: 94 %

## 2024-07-24 DIAGNOSIS — Z12.4 SCREENING FOR CERVICAL CANCER: ICD-10-CM

## 2024-07-24 DIAGNOSIS — E11.9 CONTROLLED TYPE 2 DIABETES MELLITUS WITHOUT COMPLICATION, WITHOUT LONG-TERM CURRENT USE OF INSULIN (MULTI): ICD-10-CM

## 2024-07-24 DIAGNOSIS — Z00.00 ANNUAL PHYSICAL EXAM: Primary | ICD-10-CM

## 2024-07-24 DIAGNOSIS — E78.00 PURE HYPERCHOLESTEROLEMIA: ICD-10-CM

## 2024-07-24 DIAGNOSIS — N95.8 OTHER SPECIFIED MENOPAUSAL AND PERIMENOPAUSAL DISORDERS: ICD-10-CM

## 2024-07-24 DIAGNOSIS — M81.0 AGE-RELATED OSTEOPOROSIS WITHOUT CURRENT PATHOLOGICAL FRACTURE: ICD-10-CM

## 2024-07-24 DIAGNOSIS — Z12.31 VISIT FOR SCREENING MAMMOGRAM: ICD-10-CM

## 2024-07-24 PROCEDURE — 3074F SYST BP LT 130 MM HG: CPT | Performed by: FAMILY MEDICINE

## 2024-07-24 PROCEDURE — 3048F LDL-C <100 MG/DL: CPT | Performed by: FAMILY MEDICINE

## 2024-07-24 PROCEDURE — 3008F BODY MASS INDEX DOCD: CPT | Performed by: FAMILY MEDICINE

## 2024-07-24 PROCEDURE — 3061F NEG MICROALBUMINURIA REV: CPT | Performed by: FAMILY MEDICINE

## 2024-07-24 PROCEDURE — 4010F ACE/ARB THERAPY RXD/TAKEN: CPT | Performed by: FAMILY MEDICINE

## 2024-07-24 PROCEDURE — 1036F TOBACCO NON-USER: CPT | Performed by: FAMILY MEDICINE

## 2024-07-24 PROCEDURE — 3052F HG A1C>EQUAL 8.0%<EQUAL 9.0%: CPT | Performed by: FAMILY MEDICINE

## 2024-07-24 PROCEDURE — 88175 CYTOPATH C/V AUTO FLUID REDO: CPT

## 2024-07-24 PROCEDURE — 99396 PREV VISIT EST AGE 40-64: CPT | Performed by: FAMILY MEDICINE

## 2024-07-24 PROCEDURE — 3078F DIAST BP <80 MM HG: CPT | Performed by: FAMILY MEDICINE

## 2024-07-24 PROCEDURE — 87624 HPV HI-RISK TYP POOLED RSLT: CPT

## 2024-07-24 RX ORDER — ATORVASTATIN CALCIUM 10 MG/1
10 TABLET, FILM COATED ORAL NIGHTLY
Qty: 90 TABLET | Refills: 1 | Status: SHIPPED | OUTPATIENT
Start: 2024-07-24

## 2024-07-24 RX ORDER — METFORMIN HYDROCHLORIDE 1000 MG/1
1000 TABLET ORAL
Qty: 180 TABLET | Refills: 1 | Status: SHIPPED | OUTPATIENT
Start: 2024-07-24

## 2024-07-24 RX ORDER — DOXYCYCLINE HYCLATE 50 MG/1
1 CAPSULE ORAL
COMMUNITY
Start: 2024-05-31

## 2024-07-24 RX ORDER — LISINOPRIL 2.5 MG/1
2.5 TABLET ORAL DAILY
Qty: 90 TABLET | Refills: 1 | Status: SHIPPED | OUTPATIENT
Start: 2024-07-24

## 2024-07-24 ASSESSMENT — ENCOUNTER SYMPTOMS
LOSS OF SENSATION IN FEET: 0
DEPRESSION: 0
OCCASIONAL FEELINGS OF UNSTEADINESS: 0

## 2024-07-24 ASSESSMENT — LIFESTYLE VARIABLES
HOW OFTEN DURING THE LAST YEAR HAVE YOU NEEDED AN ALCOHOLIC DRINK FIRST THING IN THE MORNING TO GET YOURSELF GOING AFTER A NIGHT OF HEAVY DRINKING: NEVER
HAS A RELATIVE, FRIEND, DOCTOR, OR ANOTHER HEALTH PROFESSIONAL EXPRESSED CONCERN ABOUT YOUR DRINKING OR SUGGESTED YOU CUT DOWN: NO
AUDIT-C TOTAL SCORE: 4
HOW OFTEN DO YOU HAVE SIX OR MORE DRINKS ON ONE OCCASION: NEVER
HOW OFTEN DURING THE LAST YEAR HAVE YOU BEEN UNABLE TO REMEMBER WHAT HAPPENED THE NIGHT BEFORE BECAUSE YOU HAD BEEN DRINKING: NEVER
HOW MANY STANDARD DRINKS CONTAINING ALCOHOL DO YOU HAVE ON A TYPICAL DAY: 1 OR 2
HOW OFTEN DO YOU HAVE A DRINK CONTAINING ALCOHOL: 4 OR MORE TIMES A WEEK
HOW OFTEN DURING THE LAST YEAR HAVE YOU FAILED TO DO WHAT WAS NORMALLY EXPECTED FROM YOU BECAUSE OF DRINKING: NEVER
HOW OFTEN DURING THE LAST YEAR HAVE YOU HAD A FEELING OF GUILT OR REMORSE AFTER DRINKING: NEVER
SKIP TO QUESTIONS 9-10: 1
HOW OFTEN DURING THE LAST YEAR HAVE YOU FOUND THAT YOU WERE NOT ABLE TO STOP DRINKING ONCE YOU HAD STARTED: NEVER
HAVE YOU OR SOMEONE ELSE BEEN INJURED AS A RESULT OF YOUR DRINKING: NO
AUDIT TOTAL SCORE: 4

## 2024-07-24 ASSESSMENT — PATIENT HEALTH QUESTIONNAIRE - PHQ9
2. FEELING DOWN, DEPRESSED OR HOPELESS: NOT AT ALL
1. LITTLE INTEREST OR PLEASURE IN DOING THINGS: NOT AT ALL
SUM OF ALL RESPONSES TO PHQ9 QUESTIONS 1 AND 2: 0

## 2024-07-24 ASSESSMENT — COLUMBIA-SUICIDE SEVERITY RATING SCALE - C-SSRS
2. HAVE YOU ACTUALLY HAD ANY THOUGHTS OF KILLING YOURSELF?: NO
1. IN THE PAST MONTH, HAVE YOU WISHED YOU WERE DEAD OR WISHED YOU COULD GO TO SLEEP AND NOT WAKE UP?: NO

## 2024-08-21 ENCOUNTER — HOSPITAL ENCOUNTER (OUTPATIENT)
Dept: RADIOLOGY | Facility: CLINIC | Age: 64
Discharge: HOME | End: 2024-08-21
Payer: COMMERCIAL

## 2024-08-21 VITALS — WEIGHT: 119 LBS | HEIGHT: 68 IN | BODY MASS INDEX: 18.04 KG/M2

## 2024-08-21 DIAGNOSIS — Z12.31 VISIT FOR SCREENING MAMMOGRAM: ICD-10-CM

## 2024-08-21 PROCEDURE — 77067 SCR MAMMO BI INCL CAD: CPT | Performed by: RADIOLOGY

## 2024-08-21 PROCEDURE — 77063 BREAST TOMOSYNTHESIS BI: CPT | Performed by: RADIOLOGY

## 2024-08-21 PROCEDURE — 77067 SCR MAMMO BI INCL CAD: CPT

## 2024-09-12 ENCOUNTER — PATIENT MESSAGE (OUTPATIENT)
Dept: PRIMARY CARE | Facility: CLINIC | Age: 64
End: 2024-09-12
Payer: COMMERCIAL

## 2024-09-12 DIAGNOSIS — Z00.00 HEALTHCARE MAINTENANCE: ICD-10-CM

## 2024-09-13 ENCOUNTER — LAB (OUTPATIENT)
Dept: LAB | Facility: LAB | Age: 64
End: 2024-09-13
Payer: COMMERCIAL

## 2024-09-13 DIAGNOSIS — M81.0 AGE-RELATED OSTEOPOROSIS WITHOUT CURRENT PATHOLOGICAL FRACTURE: ICD-10-CM

## 2024-09-13 LAB
ALBUMIN SERPL BCP-MCNC: 4.8 G/DL (ref 3.4–5)
ALP SERPL-CCNC: 45 U/L (ref 33–136)
ALT SERPL W P-5'-P-CCNC: 18 U/L (ref 7–45)
ANION GAP SERPL CALC-SCNC: 15 MMOL/L (ref 10–20)
AST SERPL W P-5'-P-CCNC: 18 U/L (ref 9–39)
BILIRUB SERPL-MCNC: 0.7 MG/DL (ref 0–1.2)
BUN SERPL-MCNC: 17 MG/DL (ref 6–23)
CALCIUM SERPL-MCNC: 9.8 MG/DL (ref 8.6–10.6)
CHLORIDE SERPL-SCNC: 99 MMOL/L (ref 98–107)
CO2 SERPL-SCNC: 30 MMOL/L (ref 21–32)
CREAT SERPL-MCNC: 0.69 MG/DL (ref 0.5–1.05)
EGFRCR SERPLBLD CKD-EPI 2021: >90 ML/MIN/1.73M*2
GLUCOSE SERPL-MCNC: 226 MG/DL (ref 74–99)
POTASSIUM SERPL-SCNC: 4.2 MMOL/L (ref 3.5–5.3)
PROT SERPL-MCNC: 7.3 G/DL (ref 6.4–8.2)
SODIUM SERPL-SCNC: 140 MMOL/L (ref 136–145)

## 2024-09-13 PROCEDURE — 80053 COMPREHEN METABOLIC PANEL: CPT

## 2024-09-13 PROCEDURE — 36415 COLL VENOUS BLD VENIPUNCTURE: CPT

## 2024-09-17 ENCOUNTER — APPOINTMENT (OUTPATIENT)
Dept: INFUSION THERAPY | Facility: CLINIC | Age: 64
End: 2024-09-17
Payer: COMMERCIAL

## 2024-09-17 VITALS
RESPIRATION RATE: 16 BRPM | SYSTOLIC BLOOD PRESSURE: 95 MMHG | OXYGEN SATURATION: 94 % | TEMPERATURE: 97.4 F | HEART RATE: 77 BPM | DIASTOLIC BLOOD PRESSURE: 63 MMHG

## 2024-09-17 DIAGNOSIS — M81.0 AGE-RELATED OSTEOPOROSIS WITHOUT CURRENT PATHOLOGICAL FRACTURE: ICD-10-CM

## 2024-09-17 PROCEDURE — 96372 THER/PROPH/DIAG INJ SC/IM: CPT | Performed by: NURSE PRACTITIONER

## 2024-09-17 RX ORDER — FAMOTIDINE 10 MG/ML
20 INJECTION INTRAVENOUS ONCE AS NEEDED
OUTPATIENT
Start: 2025-03-07

## 2024-09-17 RX ORDER — EPINEPHRINE 0.3 MG/.3ML
0.3 INJECTION SUBCUTANEOUS EVERY 5 MIN PRN
OUTPATIENT
Start: 2025-03-07

## 2024-09-17 RX ORDER — ALBUTEROL SULFATE 0.83 MG/ML
3 SOLUTION RESPIRATORY (INHALATION) AS NEEDED
OUTPATIENT
Start: 2025-03-07

## 2024-09-17 RX ORDER — DIPHENHYDRAMINE HYDROCHLORIDE 50 MG/ML
50 INJECTION INTRAMUSCULAR; INTRAVENOUS AS NEEDED
OUTPATIENT
Start: 2025-03-07

## 2024-09-17 ASSESSMENT — ENCOUNTER SYMPTOMS
WOUND: 0
EXTREMITY WEAKNESS: 0
NUMBNESS: 0
LEG SWELLING: 0
SHORTNESS OF BREATH: 0
WHEEZING: 0
COUGH: 0
LIGHT-HEADEDNESS: 0
PALPITATIONS: 0
DIZZINESS: 0

## 2024-09-17 NOTE — PROGRESS NOTES
[facility]   Infusion Clinic Note   Date: 2024   Name: Sushila Garvin  : 1960   MRN: [id number]          Reason for Visit: Injections (Every 6 months Prolia 60mg subcutaneous injection)         Today: We administered denosumab.       Visit Type: INJECTION       Ordered By: Miri Ware MD       Accompanied by:Self       Diagnosis: Age-related osteoporosis without current pathological fracture        Allergies:   Allergies as of 2024    (No Known Allergies)          Current Medications has a current medication list which includes the following prescription(s): atorvastatin, cholecalciferol, denosumab, doxycycline, empagliflozin, estrogens (conjugated), lisinopril, metformin, metronidazole, multivitamin 50 plus, pimecrolimus, and semaglutide.       Vitals:   Vitals:    24 1100   BP: 95/63   Pulse: 77   Resp: 16   Temp: 36.3 °C (97.4 °F)   TempSrc: Temporal   SpO2: 94%             Infusion Pre-procedure Checklist:   - Allergies reviewed: yes   - Medications reviewed: yes       - Previous reaction to current treatment: no      Assess patient for the concerns below. Document provider notification as appropriate.  - Active or recent infection with/without current antibiotic use: no  - Recent or planned invasive dental work: no  - Recent or planned surgeries: no  - Recently received or plans to receive vaccinations:  2024 Flu and Covid   - Has treatment related toxicities: no  - Is pregnant:  no      Pain: 0   - Is the pain different from normal: n/a   - Is your Doctor aware:  n/a                Fall Risk Screening: Chapin Fall Risk  History of Falling, Immediate or Within 3 Months: No  Ambulatory Aid: Walks without aid/bedrest/nurse assist  Intravenous Therapy/Heparin Lock: No  Gait/Transferring: Normal/bedrest/immobile  Mental Status: Oriented to own ability       Review Of Systems:  Review of Systems   Respiratory:  Negative for cough, shortness of breath and  wheezing.    Cardiovascular:  Negative for chest pain, leg swelling and palpitations.   Skin:  Negative for itching, rash and wound.   Neurological:  Negative for dizziness, extremity weakness, light-headedness and numbness.         ROS completed? Yes      Infusion Readiness:  - Assessment Concerns Related to Infusion: No  - Provider notified: n/a      Document Below Only If Indicated:   New Patient Education:    N/A (returning patient for continuation of therapy. Ongoing education provided as needed.)        Treatment Conditions & Drug Specific Questions:    Denosumab  (PROLIA. XGEVA)    (Unless otherwise specified on patient specific therapy plan):     TREATMENT CONDITIONS:  Unless otherwise specified on patient specific therapy plan HOLD and notify provider prior to proceeding with today's injection if patients:  o Calcium is LESS THAN 8.6 mg/dL OR  Ionized Calcium LESS THAN 1.1 mmol/L  o Recent or planned invasive dental procedure (within 4 weeks)    Lab Results   Component Value Date    CALCIUM 9.8 09/13/2024    PHOS 4.9 12/19/2022      Labs reviewed and patient meets treatment conditions? Yes    DRUG SPECIFIC QUESTIONS:  Is the patient taking calcium and vitamin D? Yes Vitamin D.  No Calcium, H/O kidney stones  (Recommended)    Pt Instructed on following risks: (1) hypocalcemia, (2) osteonecrosis of the jaw, (3) atypical femoral fractures, (4) serious infections, and (5) dermatologic reactions?  Yes      REMINDER:  PREGNANCY CATEGORY X DRUG. OBTAIN NEGTATIVE PREGNANCY TEST PRIOR TO FIRST INFUSION FOR WOMEN OF CHILDBEARING ABILITY   REMS DRUG    Recommended Vitals/Observation:  Vitals: Obtain vitals prior to injection.  Observation: Patient may leave immediately following injection.        Weight Based Drug Calculations:    WEIGHT BASED DRUGS: NOT APPLICABLE / FLAT DOSE          Initiated By: Mere Hu LPN

## 2024-09-17 NOTE — PATIENT INSTRUCTIONS
Today :We administered denosumab. Prolia 60mg subcutaneous injection left lower abdomen  Blood Calcium within 28 days of next Prolia appointment    For:   1. Age-related osteoporosis without current pathological fracture       Your next appointment is due in:  6 months       Please read the  Medication Guide that was given to you and reviewed during todays visit.     (Tell all doctors including dentists that you are taking this medication)     Go to the emergency room or call 911 if:  -You have signs of allergic reaction:   -Rash, hives, itching.   -Swollen, blistered, peeling skin.   -Swelling of face, lips, mouth, tongue or throat.   -Tightness of chest, trouble breathing, swallowing or talking     Call your doctor:  - If injection site gets red, warm, swollen, itchy or leaks fluid or pus.     (Leave band aid on your injection site for at least 2 hours and keep area clean and dry  - If you get sick or have symptoms of infection or are not feeling well for any reason.    (Wash your hands often, stay away from people who are sick)  - If you have side effects from your medication that do not go away or are bothersome.     (Refer to the teaching your nurse gave you for side effects to call your doctor about)    - Common side effects may include:  stuffy nose, headache, feeling tired, muscle aches, upset stomach  - Before receiving any vaccines     - Call the [facility] at   If:  - You get sick, are on antibiotics, have had a recent vaccine, have surgery or dental work and your doctor wants your visit rescheduled.  - You need to cancel and reschedule your visit for any reason. Call at least 2 days before your visit if you need to cancel.   - Your insurance changes before your next visit.    (We will need to get approval from your new insurance. This can take up to two weeks.)     The [facility] is opened Monday thru Friday. We are closed on weekends and holidays.   Voice mail will take  your call if the center is closed. If you leave a message please allow 24 hours for a call back during weekdays. If you leave a message on a weekend/holiday, we will call you back the next business day.

## 2024-10-02 ENCOUNTER — HOSPITAL ENCOUNTER (OUTPATIENT)
Dept: RADIOLOGY | Facility: CLINIC | Age: 64
Discharge: HOME | End: 2024-10-02
Payer: COMMERCIAL

## 2024-10-02 DIAGNOSIS — N95.8 OTHER SPECIFIED MENOPAUSAL AND PERIMENOPAUSAL DISORDERS: ICD-10-CM

## 2024-10-02 PROCEDURE — 77080 DXA BONE DENSITY AXIAL: CPT | Performed by: RADIOLOGY

## 2024-10-02 PROCEDURE — 77080 DXA BONE DENSITY AXIAL: CPT

## 2024-12-21 DIAGNOSIS — E78.00 PURE HYPERCHOLESTEROLEMIA: ICD-10-CM

## 2024-12-21 DIAGNOSIS — E11.9 CONTROLLED TYPE 2 DIABETES MELLITUS WITHOUT COMPLICATION, WITHOUT LONG-TERM CURRENT USE OF INSULIN (MULTI): ICD-10-CM

## 2024-12-23 RX ORDER — ATORVASTATIN CALCIUM 10 MG/1
10 TABLET, FILM COATED ORAL NIGHTLY
Qty: 90 TABLET | Refills: 0 | Status: SHIPPED | OUTPATIENT
Start: 2024-12-23

## 2024-12-23 RX ORDER — LISINOPRIL 2.5 MG/1
2.5 TABLET ORAL DAILY
Qty: 90 TABLET | Refills: 0 | Status: SHIPPED | OUTPATIENT
Start: 2024-12-23

## 2024-12-23 RX ORDER — METFORMIN HYDROCHLORIDE 1000 MG/1
1000 TABLET ORAL 2 TIMES DAILY
Qty: 180 TABLET | Refills: 0 | Status: SHIPPED | OUTPATIENT
Start: 2024-12-23

## 2025-01-06 DIAGNOSIS — M81.0 AGE-RELATED OSTEOPOROSIS WITHOUT CURRENT PATHOLOGICAL FRACTURE: Primary | ICD-10-CM

## 2025-01-23 ENCOUNTER — LAB (OUTPATIENT)
Dept: LAB | Facility: LAB | Age: 65
End: 2025-01-23
Payer: COMMERCIAL

## 2025-01-23 DIAGNOSIS — E11.9 CONTROLLED TYPE 2 DIABETES MELLITUS WITHOUT COMPLICATION, WITHOUT LONG-TERM CURRENT USE OF INSULIN (MULTI): ICD-10-CM

## 2025-01-23 LAB
ALBUMIN SERPL BCP-MCNC: 4.6 G/DL (ref 3.4–5)
ALP SERPL-CCNC: 42 U/L (ref 33–136)
ALT SERPL W P-5'-P-CCNC: 20 U/L (ref 7–45)
ANION GAP SERPL CALC-SCNC: 14 MMOL/L (ref 10–20)
AST SERPL W P-5'-P-CCNC: 19 U/L (ref 9–39)
BILIRUB SERPL-MCNC: 0.5 MG/DL (ref 0–1.2)
BUN SERPL-MCNC: 20 MG/DL (ref 6–23)
CALCIUM SERPL-MCNC: 9.8 MG/DL (ref 8.6–10.6)
CHLORIDE SERPL-SCNC: 103 MMOL/L (ref 98–107)
CO2 SERPL-SCNC: 32 MMOL/L (ref 21–32)
CREAT SERPL-MCNC: 0.67 MG/DL (ref 0.5–1.05)
EGFRCR SERPLBLD CKD-EPI 2021: >90 ML/MIN/1.73M*2
EST. AVERAGE GLUCOSE BLD GHB EST-MCNC: 189 MG/DL
GLUCOSE SERPL-MCNC: 225 MG/DL (ref 74–99)
HBA1C MFR BLD: 8.2 %
POTASSIUM SERPL-SCNC: 4 MMOL/L (ref 3.5–5.3)
PROT SERPL-MCNC: 6.9 G/DL (ref 6.4–8.2)
SODIUM SERPL-SCNC: 145 MMOL/L (ref 136–145)

## 2025-01-23 PROCEDURE — 80053 COMPREHEN METABOLIC PANEL: CPT

## 2025-01-23 PROCEDURE — 83036 HEMOGLOBIN GLYCOSYLATED A1C: CPT

## 2025-01-28 ENCOUNTER — APPOINTMENT (OUTPATIENT)
Dept: PRIMARY CARE | Facility: CLINIC | Age: 65
End: 2025-01-28
Payer: COMMERCIAL

## 2025-01-28 VITALS
HEIGHT: 68 IN | BODY MASS INDEX: 18.34 KG/M2 | DIASTOLIC BLOOD PRESSURE: 62 MMHG | OXYGEN SATURATION: 97 % | SYSTOLIC BLOOD PRESSURE: 102 MMHG | HEART RATE: 73 BPM | WEIGHT: 121 LBS

## 2025-01-28 DIAGNOSIS — E55.9 VITAMIN D DEFICIENCY: ICD-10-CM

## 2025-01-28 DIAGNOSIS — E78.00 PURE HYPERCHOLESTEROLEMIA: ICD-10-CM

## 2025-01-28 DIAGNOSIS — E11.9 CONTROLLED TYPE 2 DIABETES MELLITUS WITHOUT COMPLICATION, WITHOUT LONG-TERM CURRENT USE OF INSULIN (MULTI): Primary | ICD-10-CM

## 2025-01-28 DIAGNOSIS — M81.0 AGE-RELATED OSTEOPOROSIS WITHOUT CURRENT PATHOLOGICAL FRACTURE: ICD-10-CM

## 2025-01-28 PROCEDURE — 3052F HG A1C>EQUAL 8.0%<EQUAL 9.0%: CPT | Performed by: FAMILY MEDICINE

## 2025-01-28 PROCEDURE — 3074F SYST BP LT 130 MM HG: CPT | Performed by: FAMILY MEDICINE

## 2025-01-28 PROCEDURE — 3078F DIAST BP <80 MM HG: CPT | Performed by: FAMILY MEDICINE

## 2025-01-28 PROCEDURE — 99214 OFFICE O/P EST MOD 30 MIN: CPT | Performed by: FAMILY MEDICINE

## 2025-01-28 PROCEDURE — 3008F BODY MASS INDEX DOCD: CPT | Performed by: FAMILY MEDICINE

## 2025-01-28 PROCEDURE — 4010F ACE/ARB THERAPY RXD/TAKEN: CPT | Performed by: FAMILY MEDICINE

## 2025-01-28 PROCEDURE — 1036F TOBACCO NON-USER: CPT | Performed by: FAMILY MEDICINE

## 2025-01-28 RX ORDER — DULAGLUTIDE 1.5 MG/.5ML
1.5 INJECTION, SOLUTION SUBCUTANEOUS WEEKLY
Qty: 2 ML | Refills: 0 | Status: SHIPPED | OUTPATIENT
Start: 2025-02-28 | End: 2025-03-28

## 2025-01-28 RX ORDER — ATORVASTATIN CALCIUM 10 MG/1
10 TABLET, FILM COATED ORAL NIGHTLY
Qty: 90 TABLET | Refills: 1 | Status: SHIPPED | OUTPATIENT
Start: 2025-01-28

## 2025-01-28 RX ORDER — LISINOPRIL 2.5 MG/1
2.5 TABLET ORAL DAILY
Qty: 90 TABLET | Refills: 1 | Status: SHIPPED | OUTPATIENT
Start: 2025-01-28

## 2025-01-28 RX ORDER — METFORMIN HYDROCHLORIDE 1000 MG/1
1000 TABLET ORAL 2 TIMES DAILY
Qty: 180 TABLET | Refills: 1 | Status: SHIPPED | OUTPATIENT
Start: 2025-01-28

## 2025-01-28 RX ORDER — DULAGLUTIDE 0.75 MG/.5ML
0.75 INJECTION, SOLUTION SUBCUTANEOUS
Qty: 2 ML | Refills: 0 | Status: SHIPPED | OUTPATIENT
Start: 2025-01-28 | End: 2025-01-30 | Stop reason: SDUPTHER

## 2025-01-28 NOTE — ASSESSMENT & PLAN NOTE
Continue atorvastatin.   Orders:    atorvastatin (Lipitor) 10 mg tablet; Take 1 tablet (10 mg) by mouth once daily at bedtime.    Lipid Panel; Future

## 2025-01-28 NOTE — ASSESSMENT & PLAN NOTE
Patient is on Rybelsus 7 mg ; however, her HbA1c is 8.2 in 1/2025, poorly controlled. Will consider adding Trulicity at 0.75 for a month, increase to 1.5 a month after that, and then to 3 mg. Side effects discussed; patient will let me know if any. Discontinue Rybelsus. Continue metformin and Jardiance.     Orders:    Follow Up In [facility] - PCP - Established    dulaglutide (Trulicity) 0.75 mg/0.5 mL pen injector; Inject 0.75 mg under the skin every 7 days for 28 days.    dulaglutide (Trulicity) 1.5 mg/0.5 mL pen injector injection; Inject 1.5 mg under the skin 1 (one) time per week for 28 days. Do not fill before February 28, 2025.    dulaglutide (Trulicity) 3 mg/0.5 mL injection; Inject 3 mg under the skin every 7 days. Do not fill before March 24, 2025.    empagliflozin (Jardiance) 25 mg; Take 1 tablet (25 mg) by mouth once daily.    lisinopril 2.5 mg tablet; Take 1 tablet (2.5 mg) by mouth once daily.    metFORMIN (Glucophage) 1,000 mg tablet; Take 1 tablet (1,000 mg) by mouth 2 times a day.    Follow Up In [facility] - PCP - Health Maintenance; Future    Albumin-Creatinine Ratio, Urine Random; Future    CBC; Future    Comprehensive Metabolic Panel; Future    Hemoglobin A1C; Future

## 2025-01-28 NOTE — PROGRESS NOTES
"Subjective   Patient ID: Sushila Garvin is a 64 y.o. female who presents for Follow-up (6 Month).    HPI     The patient is on atorvastatin for hypercholesterolemia. She is also taking metformin, Jardiance and Rybelsus for diabetes, is on Prolia for osteoporosis and metronidazole and doxycyline for rosacea. She is taking these medications as prescribed and is tolerating them well. Additionally, takes Vitamin D3 and a multivitamin.     Her HbA1c is 8.2 in 1/2025.  She attributes this to not eating mindfully over the holidays and lack of exercise over the winter.      Review of Systems  Constitutional: No fever or chills  Cardiovascular: no chest pain, no palpitations and no syncope.   Respiratory: no cough, no shortness of breath during exertion and no shortness of breath at rest.   Gastrointestinal: no abdominal pain, no nausea and no vomiting.  Neuro: No Headache, no dizziness    Objective   /62   Pulse 73   Ht 1.727 m (5' 8\")   Wt 54.9 kg (121 lb)   SpO2 97%   BMI 18.40 kg/m²     Physical Exam  Constitutional: Alert and in no acute distress. Well developed, well nourished  Head and Face: Head and face: Normal.    Cardiovascular: Heart rate and rhythm were normal, normal S1 and S2. No peripheral edema.   Pulmonary: No respiratory distress. Clear bilateral breath sounds.  Musculoskeletal: Gait and station: Normal. Muscle strength/tone: Normal.   Skin: Normal skin color and pigmentation, normal skin turgor, and no rash.    Psychiatric: Judgment and insight: Intact. Mood and affect: Normal.      Lab Results   Component Value Date    WBC 7.5 07/18/2024    HGB 14.0 07/18/2024    HCT 44.3 07/18/2024     07/18/2024    CHOL 168 07/18/2024    TRIG 119 07/18/2024    HDL 68.7 07/18/2024    ALT 20 01/23/2025    AST 19 01/23/2025     01/23/2025    K 4.0 01/23/2025     01/23/2025    CREATININE 0.67 01/23/2025    BUN 20 01/23/2025    CO2 32 01/23/2025    TSH 1.33 07/18/2024    HGBA1C 8.2 (H) 01/23/2025 "       XR DEXA bone density  Narrative: Interpreted By:  Manish Kearns,   STUDY:  DEXA BONE [id number]/2/2024 1:46 pm      INDICATION:  Signs/Symptoms:screening. The patient is a 63 y/o  year old F.  ,N95.8 Other specified menopausal and perimenopausal disorders      COMPARISON:  Most recent prior is from 12/08/2021.      ACCESSION NUMBER(S):  [id number]      ORDERING CLINICIAN:  ROXANN JIN      TECHNIQUE:  DEXA BONE DENSITY      FINDINGS:  SPINE L1-L4  Bone Mineral Density: 0.864  T-Score -1.7  Z-Score 0.0  Bone Mineral Density change vs baseline:  18.8  Bone Mineral Density change vs previous: 8.3      LEFT FEMUR -TOTAL  Bone Mineral Density: 0.727  T-Score -1.8   Z-Score  -0.6  Bone Mineral Density change vs baseline: 8.8  Bone Mineral Density change vs previous: 2.3      LEFT FEMUR -NECK  Bone Mineral Density: 0.601  T-Score -2.2  Z-Score -0.8  Bone Mineral Density change vs baseline: 4.2  Bone Mineral Density change vs previous: 6.2          World Health Organization (WHO) criteria for post-menopausal,   Women:  Normal:         T-score at or above -1 SD  Osteopenia:   T-score between -1 and -2.5 SD  Osteoporosis: T-score at or below -2.5 SD          10-year Fracture Risk:  Major Osteoporotic Fracture  Not reported  Hip Fracture                        Not reported      Note:  If no FRAX score is reported, it is because:  Some T-score for Spine Total or Hip Total or Femoral Neck at or below  -2.5      This exam was performed at [facility] on a Hologic  [facility] Dexa Unit.      Impression: DEXA:  According to World Health Organization criteria,  classification is low bone mass (osteopenia)      Followup recommended in two years or sooner as clinically warranted.      All images and detailed analysis are available on the  Radiology  PACS.      MACRO:  None      Signed by: Manish Kearns 10/2/2024 1:50 PM  Dictation workstation:   [id number]      Assessment/Plan   Assessment &  Plan  Controlled type 2 diabetes mellitus without complication, without long-term current use of insulin (Multi)  Patient is on Rybelsus 7 mg ; however, her HbA1c is 8.2 in 1/2025, poorly controlled. Will consider adding Trulicity at 0.75 for a month, increase to 1.5 a month after that, and then to 3 mg. Side effects discussed; patient will let me know if any. Discontinue Rybelsus. Continue metformin and Jardiance.     Orders:    Follow Up In [facility] - PCP - Established    dulaglutide (Trulicity) 0.75 mg/0.5 mL pen injector; Inject 0.75 mg under the skin every 7 days for 28 days.    dulaglutide (Trulicity) 1.5 mg/0.5 mL pen injector injection; Inject 1.5 mg under the skin 1 (one) time per week for 28 days. Do not fill before February 28, 2025.    dulaglutide (Trulicity) 3 mg/0.5 mL injection; Inject 3 mg under the skin every 7 days. Do not fill before March 24, 2025.    empagliflozin (Jardiance) 25 mg; Take 1 tablet (25 mg) by mouth once daily.    lisinopril 2.5 mg tablet; Take 1 tablet (2.5 mg) by mouth once daily.    metFORMIN (Glucophage) 1,000 mg tablet; Take 1 tablet (1,000 mg) by mouth 2 times a day.    Follow Up In [facility] - PCP - Health Maintenance; Future    Albumin-Creatinine Ratio, Urine Random; Future    CBC; Future    Comprehensive Metabolic Panel; Future    Hemoglobin A1C; Future    Pure hypercholesterolemia   Continue atorvastatin.   Orders:    atorvastatin (Lipitor) 10 mg tablet; Take 1 tablet (10 mg) by mouth once daily at bedtime.    Lipid Panel; Future    Age-related osteoporosis without current pathological fracture  Continue Prolia       Body mass index (BMI) 19.9 or less, adult  Discontinue Rybelsus  Orders:    TSH with reflex to Free T4 if abnormal; Future    Vitamin D deficiency  Labs ordered  Orders:    Vitamin B12; Future    Vitamin D 25-Hydroxy,Total (for eval of Vitamin D levels); Future        Your yearly Physical is due in a year. July 2025  When you call the  office for your yearly Physical, please ask them to inform me to order your blood work, so that you can get the fasting blood work before your appointment and we can discuss the results at your physical.      Please call me if any questions arise from now until your next visit. I will call you after I am done seeing patients. A Doctor is always available by phone when the office is closed. Please feel free to call for help with any problem that you feel shouldn't wait until the office re-opens.     Scribe Attestation  By signing my name below, I, Jonathon Son   attest that this documentation has been prepared under the direction and in the presence of Miri Ware MD.

## 2025-01-30 DIAGNOSIS — E11.9 CONTROLLED TYPE 2 DIABETES MELLITUS WITHOUT COMPLICATION, WITHOUT LONG-TERM CURRENT USE OF INSULIN (MULTI): ICD-10-CM

## 2025-01-30 RX ORDER — DULAGLUTIDE 0.75 MG/.5ML
0.75 INJECTION, SOLUTION SUBCUTANEOUS
Qty: 2 ML | Refills: 0 | Status: SHIPPED | OUTPATIENT
Start: 2025-01-30 | End: 2025-02-27

## 2025-02-21 DIAGNOSIS — E11.9 CONTROLLED TYPE 2 DIABETES MELLITUS WITHOUT COMPLICATION, WITHOUT LONG-TERM CURRENT USE OF INSULIN (MULTI): ICD-10-CM

## 2025-02-24 RX ORDER — DULAGLUTIDE 1.5 MG/.5ML
1.5 INJECTION, SOLUTION SUBCUTANEOUS WEEKLY
Qty: 2 ML | Refills: 0 | Status: SHIPPED | OUTPATIENT
Start: 2025-02-24

## 2025-03-12 ENCOUNTER — LAB (OUTPATIENT)
Dept: LAB | Facility: HOSPITAL | Age: 65
End: 2025-03-12
Payer: COMMERCIAL

## 2025-03-12 DIAGNOSIS — M81.0 AGE-RELATED OSTEOPOROSIS WITHOUT CURRENT PATHOLOGICAL FRACTURE: Primary | ICD-10-CM

## 2025-03-12 LAB
ALBUMIN SERPL BCP-MCNC: 4.8 G/DL (ref 3.4–5)
ALP SERPL-CCNC: 37 U/L (ref 33–136)
ALT SERPL W P-5'-P-CCNC: 13 U/L (ref 7–45)
ANION GAP SERPL CALC-SCNC: 14 MMOL/L (ref 10–20)
AST SERPL W P-5'-P-CCNC: 17 U/L (ref 9–39)
BILIRUB SERPL-MCNC: 0.6 MG/DL (ref 0–1.2)
BUN SERPL-MCNC: 21 MG/DL (ref 6–23)
CALCIUM SERPL-MCNC: 10 MG/DL (ref 8.6–10.6)
CHLORIDE SERPL-SCNC: 101 MMOL/L (ref 98–107)
CO2 SERPL-SCNC: 27 MMOL/L (ref 21–32)
CREAT SERPL-MCNC: 0.71 MG/DL (ref 0.5–1.05)
EGFRCR SERPLBLD CKD-EPI 2021: >90 ML/MIN/1.73M*2
GLUCOSE SERPL-MCNC: 154 MG/DL (ref 74–99)
POTASSIUM SERPL-SCNC: 4.1 MMOL/L (ref 3.5–5.3)
PROT SERPL-MCNC: 7.1 G/DL (ref 6.4–8.2)
SODIUM SERPL-SCNC: 138 MMOL/L (ref 136–145)

## 2025-03-12 PROCEDURE — 80053 COMPREHEN METABOLIC PANEL: CPT

## 2025-03-18 ENCOUNTER — APPOINTMENT (OUTPATIENT)
Dept: INFUSION THERAPY | Facility: CLINIC | Age: 65
End: 2025-03-18
Payer: COMMERCIAL

## 2025-03-18 VITALS
TEMPERATURE: 97.7 F | HEART RATE: 81 BPM | OXYGEN SATURATION: 96 % | SYSTOLIC BLOOD PRESSURE: 120 MMHG | DIASTOLIC BLOOD PRESSURE: 71 MMHG | RESPIRATION RATE: 16 BRPM

## 2025-03-18 DIAGNOSIS — M81.0 AGE-RELATED OSTEOPOROSIS WITHOUT CURRENT PATHOLOGICAL FRACTURE: ICD-10-CM

## 2025-03-18 PROCEDURE — 96372 THER/PROPH/DIAG INJ SC/IM: CPT | Performed by: NURSE PRACTITIONER

## 2025-03-18 RX ORDER — DIPHENHYDRAMINE HYDROCHLORIDE 50 MG/ML
50 INJECTION, SOLUTION INTRAMUSCULAR; INTRAVENOUS AS NEEDED
OUTPATIENT
Start: 2025-09-08

## 2025-03-18 RX ORDER — ALBUTEROL SULFATE 0.83 MG/ML
3 SOLUTION RESPIRATORY (INHALATION) AS NEEDED
OUTPATIENT
Start: 2025-09-08

## 2025-03-18 RX ORDER — FAMOTIDINE 10 MG/ML
20 INJECTION, SOLUTION INTRAVENOUS ONCE AS NEEDED
OUTPATIENT
Start: 2025-09-08

## 2025-03-18 RX ORDER — EPINEPHRINE 0.3 MG/.3ML
0.3 INJECTION SUBCUTANEOUS EVERY 5 MIN PRN
OUTPATIENT
Start: 2025-09-08

## 2025-03-18 ASSESSMENT — ENCOUNTER SYMPTOMS
NUMBNESS: 0
LIGHT-HEADEDNESS: 0
PALPITATIONS: 0
COUGH: 0
SHORTNESS OF BREATH: 0
WOUND: 0
LEG SWELLING: 0
WHEEZING: 0
DIZZINESS: 0
EXTREMITY WEAKNESS: 0

## 2025-03-18 NOTE — PROGRESS NOTES
[facility]   Infusion Clinic Note   Date: 2025   Name: Sushila Garvin  : 1960   MRN: [id number]         Reason for Visit: Injections (Every 6 months Prolia 60mg subcutaneous injection)         Today: We administered denosumab.       Ordered By: Miri Ware MD       For a Diagnosis of: Age-related osteoporosis without current pathological fracture       At today's visit patient accompanied by: Self      Today's Vitals:   Vitals:    25 1131   BP: 120/71   Pulse: 81   Resp: 16   Temp: 36.5 °C (97.7 °F)   SpO2: 96%             Pre - Treatment Checklist:      - Previous reaction to current treatment: no      (Assess patient for the concerns below. Document provider notification as appropriate).  - Active or recent infection with/without current antibiotic use: no  - Recent or planned invasive dental work: no  - Recent or planned surgeries: no  - Recently received or plans to receive vaccinations: no  - Has treatment related toxicities: no  - Any chance may be pregnant:  no      Pain: 0   - Is the pain different from normal: n/a   - Is prescribing Doctor aware:  n/a      Labs: Reviewed       Fall Risk Screening: Chapin Fall Risk  History of Falling, Immediate or Within 3 Months: No  Secondary Diagnosis: Yes  Ambulatory Aid: Walks without aid/bedrest/nurse assist  Intravenous Therapy/Heparin Lock: No  Gait/Transferring: Normal/bedrest/immobile  Mental Status: Oriented to own ability  Chapin Fall Risk Score: 15       Review Of Systems:  Review of Systems   Respiratory:  Negative for cough, shortness of breath and wheezing.    Cardiovascular:  Negative for chest pain, leg swelling and palpitations.   Skin:  Negative for itching, rash and wound.   Neurological:  Negative for dizziness, extremity weakness, light-headedness and numbness.         Infusion Readiness:  - Assessment Concerns Related to Infusion: No  - Provider notified: n/a      New Patient Education:    N/A (returning  patient for continuation of therapy. Ongoing education provided as needed.)        Treatment Conditions & Drug Specific Questions:    Denosumab  (PROLIA. XGEVA)    (Unless otherwise specified on patient specific therapy plan):     TREATMENT CONDITIONS:  Unless otherwise specified on patient specific therapy plan HOLD and notify provider prior to proceeding with today's injection if patients:  o Calcium is LESS THAN 8.6 mg/dL OR Ionized calcium below normal (less than 4.7mg/dl)   o Recent or planned invasive dental procedure (within 4 weeks)    Lab Results   Component Value Date    CALCIUM 10.0 03/12/2025    PHOS 4.9 12/19/2022      Patient meets treatment conditions? Yes    DRUG SPECIFIC QUESTIONS:  Is the patient taking calcium and vitamin D? Yes Vitamin D.  No Calcium, H/O kidney stones  (Recommended)    Pt Instructed on following risks: (1) hypocalcemia, (2) osteonecrosis of the jaw, (3) atypical femoral fractures, (4) serious infections, and (5) dermatologic reactions?  Yes      REMINDER:  PREGNANCY CATEGORY X DRUG. OBTAIN NEGTATIVE PREGNANCY TEST PRIOR TO FIRST INFUSION FOR WOMEN OF CHILDBEARING ABILITY   REMS DRUG    Recommended Vitals/Observation:  Vitals: Obtain vitals prior to injection.  Observation: Patient may leave immediately following injection.        Weight Based Drug Calculations:    WEIGHT BASED DRUGS: NOT APPLICABLE / FLAT DOSE       Post Treatment: Patient tolerated treatment without issue and was discharged in no apparent distress.      Note Authored / Patient Cared for By: Mere Hu LPN

## 2025-03-18 NOTE — PATIENT INSTRUCTIONS
Today :We administered denosumab. Prolia 60mg subcutaneous injection right upper arm  Blood Calcium within 28 days of next Prolia appointment    For:   1. Age-related osteoporosis without current pathological fracture       Your next appointment is due in:  6 months        Please read the  Medication Guide that was given to you and reviewed during todays visit.     (Tell all doctors including dentists that you are taking this medication)     Go to the emergency room or call 911 if:  -You have signs of allergic reaction:   -Rash, hives, itching.   -Swollen, blistered, peeling skin.   -Swelling of face, lips, mouth, tongue or throat.   -Tightness of chest, trouble breathing, swallowing or talking     Call your doctor:  - If injection site gets red, warm, swollen, itchy or leaks fluid or pus.     (Leave band aid on your injection site for at least 2 hours and keep area clean and dry  - If you get sick or have symptoms of infection or are not feeling well for any reason.    (Wash your hands often, stay away from people who are sick)  - If you have side effects from your medication that do not go away or are bothersome.     (Refer to the teaching your nurse gave you for side effects to call your doctor about)    - Common side effects may include:  stuffy nose, headache, feeling tired, muscle aches, upset stomach  - Before receiving any vaccines     - Call the [facility] at   If:  - You get sick, are on antibiotics, have had a recent vaccine, have surgery or dental work and your doctor wants your visit rescheduled.  - You need to cancel and reschedule your visit for any reason. Call at least 2 days before your visit if you need to cancel.   - Your insurance changes before your next visit.    (We will need to get approval from your new insurance. This can take up to two weeks.)     The [facility] is opened Monday thru Friday. We are closed on weekends and holidays.   Voice mail will take  your call if the center is closed. If you leave a message please allow 24 hours for a call back during weekdays. If you leave a message on a weekend/holiday, we will call you back the next business day.    A pharmacist is available Monday - Friday from 8:30AM to 3:30PM to help answer any questions you may have about your prescriptions(s). Please call pharmacy at:    [facility]: [phone]  [facility]: [phone]  [facility]: [phone]

## 2025-03-26 DIAGNOSIS — E11.9 CONTROLLED TYPE 2 DIABETES MELLITUS WITHOUT COMPLICATION, WITHOUT LONG-TERM CURRENT USE OF INSULIN: ICD-10-CM

## 2025-04-22 DIAGNOSIS — E11.9 CONTROLLED TYPE 2 DIABETES MELLITUS WITHOUT COMPLICATION, WITHOUT LONG-TERM CURRENT USE OF INSULIN: ICD-10-CM

## 2025-05-19 DIAGNOSIS — E11.9 CONTROLLED TYPE 2 DIABETES MELLITUS WITHOUT COMPLICATION, WITHOUT LONG-TERM CURRENT USE OF INSULIN: ICD-10-CM

## 2025-05-19 RX ORDER — LISINOPRIL 2.5 MG/1
2.5 TABLET ORAL DAILY
Qty: 90 TABLET | Refills: 0 | Status: SHIPPED | OUTPATIENT
Start: 2025-05-19

## 2025-05-20 RX ORDER — DULAGLUTIDE 3 MG/.5ML
3 INJECTION, SOLUTION SUBCUTANEOUS
Qty: 3 ML | Refills: 1 | Status: SHIPPED | OUTPATIENT
Start: 2025-05-20

## 2025-06-03 ENCOUNTER — TELEPHONE (OUTPATIENT)
Dept: PRIMARY CARE | Facility: CLINIC | Age: 65
End: 2025-06-03
Payer: COMMERCIAL

## 2025-06-03 DIAGNOSIS — U07.1 COVID-19 VIRUS INFECTION: Primary | ICD-10-CM

## 2025-06-28 DIAGNOSIS — E55.9 VITAMIN D DEFICIENCY: ICD-10-CM

## 2025-06-28 DIAGNOSIS — E11.9 CONTROLLED TYPE 2 DIABETES MELLITUS WITHOUT COMPLICATION, WITHOUT LONG-TERM CURRENT USE OF INSULIN: ICD-10-CM

## 2025-06-28 DIAGNOSIS — E78.00 PURE HYPERCHOLESTEROLEMIA: ICD-10-CM

## 2025-07-13 DIAGNOSIS — E11.9 CONTROLLED TYPE 2 DIABETES MELLITUS WITHOUT COMPLICATION, WITHOUT LONG-TERM CURRENT USE OF INSULIN: ICD-10-CM

## 2025-07-14 RX ORDER — DULAGLUTIDE 3 MG/.5ML
3 INJECTION, SOLUTION SUBCUTANEOUS
Qty: 2 ML | Refills: 0 | Status: SHIPPED | OUTPATIENT
Start: 2025-07-14

## 2025-07-18 LAB
25(OH)D3+25(OH)D2 SERPL-MCNC: 62 NG/ML (ref 30–100)
ALBUMIN SERPL-MCNC: 4.6 G/DL (ref 3.6–5.1)
ALP SERPL-CCNC: 37 U/L (ref 37–153)
ALT SERPL-CCNC: 13 U/L (ref 6–29)
ANION GAP SERPL CALCULATED.4IONS-SCNC: 10 MMOL/L (CALC) (ref 7–17)
AST SERPL-CCNC: 17 U/L (ref 10–35)
BILIRUB SERPL-MCNC: 0.6 MG/DL (ref 0.2–1.2)
BUN SERPL-MCNC: 16 MG/DL (ref 7–25)
CALCIUM SERPL-MCNC: 9.4 MG/DL (ref 8.6–10.4)
CHLORIDE SERPL-SCNC: 101 MMOL/L (ref 98–110)
CHOLEST SERPL-MCNC: 144 MG/DL
CHOLEST/HDLC SERPL: 2 (CALC)
CO2 SERPL-SCNC: 31 MMOL/L (ref 20–32)
CREAT SERPL-MCNC: 0.64 MG/DL (ref 0.5–1.05)
EGFRCR SERPLBLD CKD-EPI 2021: 99 ML/MIN/1.73M2
ERYTHROCYTE [DISTWIDTH] IN BLOOD BY AUTOMATED COUNT: 12 % (ref 11–15)
EST. AVERAGE GLUCOSE BLD GHB EST-MCNC: 151 MG/DL
EST. AVERAGE GLUCOSE BLD GHB EST-SCNC: 8.4 MMOL/L
GLUCOSE SERPL-MCNC: 126 MG/DL (ref 65–99)
HBA1C MFR BLD: 6.9 %
HCT VFR BLD AUTO: 45.8 % (ref 35–45)
HDLC SERPL-MCNC: 72 MG/DL
HGB BLD-MCNC: 14.9 G/DL (ref 11.7–15.5)
LDLC SERPL CALC-MCNC: 55 MG/DL (CALC)
MCH RBC QN AUTO: 34 PG (ref 27–33)
MCHC RBC AUTO-ENTMCNC: 32.5 G/DL (ref 32–36)
MCV RBC AUTO: 104.6 FL (ref 80–100)
NONHDLC SERPL-MCNC: 72 MG/DL (CALC)
PLATELET # BLD AUTO: 233 THOUSAND/UL (ref 140–400)
PMV BLD REES-ECKER: 9.6 FL (ref 7.5–12.5)
POTASSIUM SERPL-SCNC: 4.3 MMOL/L (ref 3.5–5.3)
PROT SERPL-MCNC: 6.9 G/DL (ref 6.1–8.1)
RBC # BLD AUTO: 4.38 MILLION/UL (ref 3.8–5.1)
SODIUM SERPL-SCNC: 142 MMOL/L (ref 135–146)
TRIGL SERPL-MCNC: 91 MG/DL
TSH SERPL-ACNC: 0.91 MIU/L (ref 0.4–4.5)
VIT B12 SERPL-MCNC: 545 PG/ML (ref 200–1100)
WBC # BLD AUTO: 7.3 THOUSAND/UL (ref 3.8–10.8)

## 2025-07-28 ENCOUNTER — APPOINTMENT (OUTPATIENT)
Dept: PRIMARY CARE | Facility: CLINIC | Age: 65
End: 2025-07-28
Payer: COMMERCIAL

## 2025-07-28 VITALS
HEIGHT: 68 IN | WEIGHT: 107 LBS | BODY MASS INDEX: 16.22 KG/M2 | SYSTOLIC BLOOD PRESSURE: 103 MMHG | DIASTOLIC BLOOD PRESSURE: 62 MMHG | OXYGEN SATURATION: 95 % | HEART RATE: 88 BPM

## 2025-07-28 DIAGNOSIS — M81.0 AGE-RELATED OSTEOPOROSIS WITHOUT CURRENT PATHOLOGICAL FRACTURE: ICD-10-CM

## 2025-07-28 DIAGNOSIS — E11.9 CONTROLLED TYPE 2 DIABETES MELLITUS WITHOUT COMPLICATION, WITHOUT LONG-TERM CURRENT USE OF INSULIN: ICD-10-CM

## 2025-07-28 DIAGNOSIS — Z12.31 VISIT FOR SCREENING MAMMOGRAM: ICD-10-CM

## 2025-07-28 DIAGNOSIS — Z23 ENCOUNTER FOR IMMUNIZATION: ICD-10-CM

## 2025-07-28 DIAGNOSIS — Z00.00 ANNUAL PHYSICAL EXAM: Primary | ICD-10-CM

## 2025-07-28 DIAGNOSIS — E78.00 PURE HYPERCHOLESTEROLEMIA: ICD-10-CM

## 2025-07-28 PROCEDURE — 3008F BODY MASS INDEX DOCD: CPT | Performed by: FAMILY MEDICINE

## 2025-07-28 PROCEDURE — 4010F ACE/ARB THERAPY RXD/TAKEN: CPT | Performed by: FAMILY MEDICINE

## 2025-07-28 PROCEDURE — 99396 PREV VISIT EST AGE 40-64: CPT | Performed by: FAMILY MEDICINE

## 2025-07-28 PROCEDURE — 90677 PCV20 VACCINE IM: CPT | Performed by: FAMILY MEDICINE

## 2025-07-28 PROCEDURE — 1036F TOBACCO NON-USER: CPT | Performed by: FAMILY MEDICINE

## 2025-07-28 PROCEDURE — 3074F SYST BP LT 130 MM HG: CPT | Performed by: FAMILY MEDICINE

## 2025-07-28 PROCEDURE — 90471 IMMUNIZATION ADMIN: CPT | Performed by: FAMILY MEDICINE

## 2025-07-28 PROCEDURE — 3078F DIAST BP <80 MM HG: CPT | Performed by: FAMILY MEDICINE

## 2025-07-28 RX ORDER — METFORMIN HYDROCHLORIDE 1000 MG/1
1000 TABLET ORAL 2 TIMES DAILY
Qty: 180 TABLET | Refills: 1 | Status: SHIPPED | OUTPATIENT
Start: 2025-07-28

## 2025-07-28 RX ORDER — ATORVASTATIN CALCIUM 10 MG/1
10 TABLET, FILM COATED ORAL NIGHTLY
Qty: 90 TABLET | Refills: 1 | Status: SHIPPED | OUTPATIENT
Start: 2025-07-28

## 2025-07-28 RX ORDER — LISINOPRIL 2.5 MG/1
2.5 TABLET ORAL DAILY
Qty: 90 TABLET | Refills: 1 | Status: SHIPPED | OUTPATIENT
Start: 2025-07-28

## 2025-07-28 RX ORDER — DULAGLUTIDE 3 MG/.5ML
3 INJECTION, SOLUTION SUBCUTANEOUS
Qty: 6 ML | Refills: 1 | Status: SHIPPED | OUTPATIENT
Start: 2025-07-28

## 2025-07-28 ASSESSMENT — LIFESTYLE VARIABLES
HOW OFTEN DURING THE LAST YEAR HAVE YOU HAD A FEELING OF GUILT OR REMORSE AFTER DRINKING: NEVER
HOW OFTEN DURING THE LAST YEAR HAVE YOU FAILED TO DO WHAT WAS NORMALLY EXPECTED FROM YOU BECAUSE OF DRINKING: NEVER
AUDIT TOTAL SCORE: 4
HOW OFTEN DO YOU HAVE SIX OR MORE DRINKS ON ONE OCCASION: NEVER
HOW OFTEN DURING THE LAST YEAR HAVE YOU NEEDED AN ALCOHOLIC DRINK FIRST THING IN THE MORNING TO GET YOURSELF GOING AFTER A NIGHT OF HEAVY DRINKING: NEVER
SKIP TO QUESTIONS 9-10: 1
HAVE YOU OR SOMEONE ELSE BEEN INJURED AS A RESULT OF YOUR DRINKING: NO
AUDIT-C TOTAL SCORE: 4
HOW MANY STANDARD DRINKS CONTAINING ALCOHOL DO YOU HAVE ON A TYPICAL DAY: 1 OR 2
HOW OFTEN DURING THE LAST YEAR HAVE YOU FOUND THAT YOU WERE NOT ABLE TO STOP DRINKING ONCE YOU HAD STARTED: NEVER
HOW OFTEN DO YOU HAVE A DRINK CONTAINING ALCOHOL: 4 OR MORE TIMES A WEEK
HAS A RELATIVE, FRIEND, DOCTOR, OR ANOTHER HEALTH PROFESSIONAL EXPRESSED CONCERN ABOUT YOUR DRINKING OR SUGGESTED YOU CUT DOWN: NO
HOW OFTEN DURING THE LAST YEAR HAVE YOU BEEN UNABLE TO REMEMBER WHAT HAPPENED THE NIGHT BEFORE BECAUSE YOU HAD BEEN DRINKING: NEVER

## 2025-07-28 ASSESSMENT — ENCOUNTER SYMPTOMS
DEPRESSION: 0
OCCASIONAL FEELINGS OF UNSTEADINESS: 0
LOSS OF SENSATION IN FEET: 0

## 2025-07-28 ASSESSMENT — PATIENT HEALTH QUESTIONNAIRE - PHQ9
1. LITTLE INTEREST OR PLEASURE IN DOING THINGS: NOT AT ALL
2. FEELING DOWN, DEPRESSED OR HOPELESS: NOT AT ALL
SUM OF ALL RESPONSES TO PHQ9 QUESTIONS 1 AND 2: 0

## 2025-07-28 ASSESSMENT — COLUMBIA-SUICIDE SEVERITY RATING SCALE - C-SSRS
1. IN THE PAST MONTH, HAVE YOU WISHED YOU WERE DEAD OR WISHED YOU COULD GO TO SLEEP AND NOT WAKE UP?: NO
2. HAVE YOU ACTUALLY HAD ANY THOUGHTS OF KILLING YOURSELF?: NO

## 2025-07-28 NOTE — PROGRESS NOTES
"Subjective   Patient ID: Sushila Garvin is a 65 y.o. female who presents for Annual Exam.      Last Annual Physical: July 2024  Last Dental Visit: April 2025  Last Eye exam: Up-to-date  Hearing Concerns: no  Diet: well-balanced  Exercise: Regular       HPI     The patient is on atorvastatin for hypercholesterolemia, lisinopril for hypertension, metformin, Jardiance and Trulicity for diabetes, Eladil,  estrogen vaginal cream, is on Prolia for osteoporosis and metronidazole and doxycyline for rosacea. She is taking these medications as prescribed and is tolerating them well. Additionally, takes Vitamin D3 and a multivitamin. Consuming an average of 10 alcoholic drinks per week.    Blood pressure is well controlled at this time.  She is concerned about her weight loss.  She cannot eat much at a time and feels full before a meal.  She has included cheese, avocados, nuts, yoghurt in her diet.      Labs reviewed.  A1c improved at 6.9. Cholesterol good.  All other labs were normal. Hematocrit  shows she was dehydrated. Encouraged to stay well hydrated.     Review of Systems  Constitutional: No fever or chills, No Night Sweats  Eyes: No Blurry Vision or Eye sight problems  ENT: No Nasal Discharge, Hoarseness, sore throat  Cardiovascular: no chest pain, no palpitations and no syncope.   Respiratory: no cough, no shortness of breath during exertion and no shortness of breath at rest.   Gastrointestinal: no abdominal pain, no nausea and no vomiting. +constipation  : No vaginal discharge, burning with urination, no blood in urine or stools  Skin: No Skin rashes or Lesions  Neuro: No Headache, + dizziness or Numbness or tingling  Psych: No Anxiety, depression or sleeping problems  Heme: No Easy bleeding or brusing.     Objective   /62   Pulse 88   Ht 1.727 m (5' 8\")   Wt 48.5 kg (107 lb)   SpO2 95%   BMI 16.27 kg/m²     Physical Exam  Constitutional: Alert and in no acute distress. Well developed, well nourished. "   Head and Face: Head and face: Normal.    Eyes: Normal external exam. Pupils were equal in size, round, reactive to light (PERRL) with normal accommodation and extraocular movements intact (EOMI).   Ears, Nose, Mouth, and Throat: External inspection of ears and nose: Normal.  Hearing: Normal.  Nasal mucosa, septum, and turbinates: Normal.  Lips, teeth, and gums: Normal.  Oropharynx: Normal.   Neck: Lymph node swelling noted. No neck mass was observed. Supple. Thyroid not enlarged and there were no palpable thyroid nodules.   Cardiovascular: Heart rate and rhythm were normal, normal S1 and S2. Pedal pulses: Normal. No peripheral edema.   Pulmonary: No respiratory distress. Clear bilateral breath sounds.   Breast: Normal Appearance, No Masses or lumps palpated  Abdomen: Soft nontender; no abdominal mass palpated. Normal bowel sounds. No organomegaly.   : Deferred   Musculoskeletal: No joint swelling seen, normal movements of all extremities. Range of motion: Normal.  Muscle strength/tone: Normal.    Skin: Normal skin color and pigmentation, normal skin turgor, and no rash.   Neurologic: Deep tendon reflexes were 2+ and symmetric.   Psychiatric: Judgment and insight: Intact. Mood and affect: Normal.  Lymphatic: No cervical lymphadenopathy. Palpation of lymph nodes in axillae: Normal.  Palpation of lymph nodes in groin: Normal.    Lab Results   Component Value Date    WBC 7.3 07/17/2025    HGB 14.9 07/17/2025    HCT 45.8 (H) 07/17/2025     07/17/2025    CHOL 144 07/17/2025    TRIG 91 07/17/2025    HDL 72 07/17/2025    ALT 13 07/17/2025    AST 17 07/17/2025     07/17/2025    K 4.3 07/17/2025     07/17/2025    CREATININE 0.64 07/17/2025    BUN 16 07/17/2025    CO2 31 07/17/2025    TSH 0.91 07/17/2025    HGBA1C 6.9 (H) 07/17/2025       XR DEXA bone density  Narrative: Interpreted By:  Manish Kearns,   STUDY:  DEXA BONE DIQCUCV97/2/2024 1:46 pm      INDICATION:  Signs/Symptoms:screening. The patient is  a 65 y/o  year old F.  ,N95.8 Other specified menopausal and perimenopausal disorders      COMPARISON:  Most recent prior is from 12/08/2021.      ACCESSION NUMBER(S):  [id number]      ORDERING CLINICIAN:  ROXANN JIN      TECHNIQUE:  DEXA BONE DENSITY      FINDINGS:  SPINE L1-L4  Bone Mineral Density: 0.864  T-Score -1.7  Z-Score 0.0  Bone Mineral Density change vs baseline:  18.8  Bone Mineral Density change vs previous: 8.3      LEFT FEMUR -TOTAL  Bone Mineral Density: 0.727  T-Score -1.8   Z-Score  -0.6  Bone Mineral Density change vs baseline: 8.8  Bone Mineral Density change vs previous: 2.3      LEFT FEMUR -NECK  Bone Mineral Density: 0.601  T-Score -2.2  Z-Score -0.8  Bone Mineral Density change vs baseline: 4.2  Bone Mineral Density change vs previous: 6.2          World Health Organization (WHO) criteria for post-menopausal,   Women:  Normal:         T-score at or above -1 SD  Osteopenia:   T-score between -1 and -2.5 SD  Osteoporosis: T-score at or below -2.5 SD          10-year Fracture Risk:  Major Osteoporotic Fracture  Not reported  Hip Fracture                        Not reported      Note:  If no FRAX score is reported, it is because:  Some T-score for Spine Total or Hip Total or Femoral Neck at or below  -2.5      This exam was performed at [facility] on a [facility] Dexa Unit.      Impression: DEXA:  According to World Health Organization criteria,  classification is low bone mass (osteopenia)      Followup recommended in two years or sooner as clinically warranted.      All images and detailed analysis are available on the  Radiology  PACS.      MACRO:  None      Signed by: Manish Kearns 10/2/2024 1:50 PM  Dictation workstation:   [id number]      Assessment/Plan   Diagnoses and all orders for this visit:  Annual physical exam  Controlled type 2 diabetes mellitus without complication, without long-term current use of insulin  -     Follow Up In [facility] Care - PCP - Health Maintenance  -     empagliflozin (Jardiance) 10 mg tablet; Take 1 tablet (10 mg) by mouth once daily.  -     dulaglutide (Trulicity) 3 mg/0.5 mL injection; Inject 3 mg under the skin every 7 days.  -     lisinopril 2.5 mg tablet; Take 1 tablet (2.5 mg) by mouth once daily.  -     metFORMIN (Glucophage) 1,000 mg tablet; Take 1 tablet (1,000 mg) by mouth 2 times a day.  -     Hemoglobin A1C; Future  -     Comprehensive Metabolic Panel; Future  -     Follow Up In [facility] - PCP - Established; Future  Pure hypercholesterolemia  -     atorvastatin (Lipitor) 10 mg tablet; Take 1 tablet (10 mg) by mouth once daily at bedtime.  -     Lipid Panel; Future  Age-related osteoporosis without current pathological fracture  Encounter for immunization  -     Pneumococcal conjugate vaccine, 20-valent (PREVNAR 20)  Visit for screening mammogram  -     BI mammo bilateral screening tomosynthesis; Future  Body mass index (BMI) 19.9 or less, adult        Dear Sushila Garvin     It was my pleasure to take care of you today in the office. Below are the things we discussed today:    1. Immunizations: Yearly Flu shot is recommended. Not done         a: COVID: Please get new booster from the pharmacy in fall          b: Tetanus: Done in 2021.         c: Shingrix: Up-to-date         d: Prevnar: Taken today         e. RSV: Up-to-date      2. Blood Work: Reviewed   3. Seen your dentist twice a year  4. Yearly Eye exam is recommended     5. BMI: Underweight   6: Diet recommendations:   Eat Clean, Try to have as many home cooked meals as possible  Avoid processed foods which contain excess calories, sugar, and sodium.     7. Exercise recommendations:   150 minutes a week to maintain your weight      If you have to lose weight, you need a better diet and exercise plan.      8. Supplements recommended:  a - Calcium 600 mg up to twice a day to get a total of 1200 mg. Each 8 oz of milk or yogurt or 1 oz of cheese,  1 Banana, 1 serving of green Leafy vegetable has about 300 mg of Calcium, so you may subtract that amount. Calcium citrate is the only acceptable supplement to take if you take an acid suppressing medication like Prilosec; otherwise Calcium carbonate is acceptable too (It can cause Constipation).   b - Vitamin D - 2000 IU daily      9. Please get your Living will / Advance directive completed if you do not have one already. Please make sure our office has a copy of the latest one.      10. Colonoscopy: Uptodate. Last done in Feb 2017, repeat in Feb 2027  11. Mammogram: Uptodate. Ordered for Aug 2025   12. PAP: Up-to-date.  Last PAP July 2024, cytology and HPV were negative.  13. DEXA: Ordered  14: Skin Check: Please see Dermatology once a year for a Skin Check.      15. Diabetes:  A1c improved at 6.9.  Continue metformin 1000 mg BID, decrease Jardiance to 10 mg.  Continue Trulicity at 3 mg. Continue lisinopril at 2.5 mg.      16. HLD: Stable. Continue atorvastatin. Recheck lipid panel after dietary changes.      17. Osteoporosis: Continue Prolia.     18. Rosacea: The patient is on metronidazole and doxycyline.     11. BMI less than 19:   Encouraged to consume more calories and nutrient-dense foods to achieve a healthy weight.  She will include cheese, avocados, nuts, yoghurt in her diet.         Follow up in 6 months.     Follow up in one year for a Physical. Please call the office before your Physical to see if you need blood work completed prior to your physical.     Please call me if any questions arise from now until your next visit. I will call you after I am done seeing patients. A Doctor is always available by phone when the office is closed. Please feel free to call for help with any problem that you feel shouldn't wait until the office re-opens.     I, Miri Ware MD, attest that this note for 7/28/2025 accurately reflects documentation that my scribe Audrey Scott, made at my direction in my capacity  as Miri Ware MD when I treated Sushila Garvin.    Scribe Attestation  By signing my name below, I, Audrey Scott Scrrosa   attest that this documentation has been prepared under the direction and in the presence of Miri Ware MD.

## 2025-07-29 LAB
ALBUMIN/CREAT UR: 8 MG/G CREAT
CREAT UR-MCNC: 53 MG/DL (ref 20–275)
MICROALBUMIN UR-MCNC: 0.4 MG/DL

## 2025-09-09 ENCOUNTER — APPOINTMENT (OUTPATIENT)
Dept: RADIOLOGY | Facility: CLINIC | Age: 65
End: 2025-09-09
Payer: COMMERCIAL

## 2025-09-18 ENCOUNTER — APPOINTMENT (OUTPATIENT)
Dept: INFUSION THERAPY | Facility: CLINIC | Age: 65
End: 2025-09-18
Payer: COMMERCIAL

## 2026-01-28 ENCOUNTER — APPOINTMENT (OUTPATIENT)
Dept: PRIMARY CARE | Facility: CLINIC | Age: 66
End: 2026-01-28
Payer: COMMERCIAL